# Patient Record
Sex: FEMALE
[De-identification: names, ages, dates, MRNs, and addresses within clinical notes are randomized per-mention and may not be internally consistent; named-entity substitution may affect disease eponyms.]

---

## 2017-02-03 ENCOUNTER — HOSPITAL (OUTPATIENT)
Dept: OTHER | Age: 52
End: 2017-02-03
Attending: INTERNAL MEDICINE

## 2017-02-03 LAB
ALBUMIN SERPL-MCNC: 4.3 GM/DL (ref 3.6–5.1)
ALP SERPL-CCNC: 67 UNIT/L (ref 45–117)
ALT SERPL-CCNC: 22 UNIT/L
AMORPH SED URNS QL MICRO: ABNORMAL
AMPHETAMINES UR QL SCN>500 NG/ML: NEGATIVE
ANALYZER ANC (IANC): ABNORMAL
ANION GAP SERPL CALC-SCNC: 12 MMOL/L (ref 10–20)
ANION GAP SERPL CALC-SCNC: 20 MMOL/L (ref 10–20)
APAP SERPL-MCNC: <2 MCG/ML (ref 10–30)
APPEARANCE UR: ABNORMAL
AST SERPL-CCNC: 25 UNIT/L
BACTERIA #/AREA URNS HPF: ABNORMAL /HPF
BARBITURATES UR QL SCN>200 NG/ML: NEGATIVE
BASOPHILS # BLD: 0 THOUSAND/MCL (ref 0–0.3)
BASOPHILS NFR BLD: 0 %
BENZODIAZ UR QL SCN>200 NG/ML: NEGATIVE
BILIRUB CONJ SERPL-MCNC: 0.1 MG/DL (ref 0–0.2)
BILIRUB SERPL-MCNC: 0.4 MG/DL (ref 0.2–1)
BILIRUB UR QL: NEGATIVE
BUN SERPL-MCNC: 19 MG/DL (ref 10–20)
BUN SERPL-MCNC: 20 MG/DL (ref 10–20)
BUN/CREAT SERPL: 18 (ref 7–25)
BUN/CREAT SERPL: 19 (ref 7–25)
BZE UR QL SCN>150 NG/ML: NEGATIVE
CALCIUM SERPL-MCNC: 7.6 MG/DL (ref 8.4–10.2)
CALCIUM SERPL-MCNC: 8.9 MG/DL (ref 8.4–10.2)
CANNABINOIDS UR QL SCN>50 NG/ML: NEGATIVE
CAOX CRY URNS QL MICRO: ABNORMAL
CHLORIDE: 103 MMOL/L (ref 98–107)
CHLORIDE: 103 MMOL/L (ref 98–107)
CO2 SERPL-SCNC: 21 MMOL/L (ref 21–32)
CO2 SERPL-SCNC: 30 MMOL/L (ref 21–32)
COLOR UR: YELLOW
CREAT SERPL-MCNC: 1 MG/DL (ref 0.51–0.95)
CREAT SERPL-MCNC: 1.14 MG/DL (ref 0.51–0.95)
DIFFERENTIAL METHOD BLD: ABNORMAL
EOSINOPHIL # BLD: 0 THOUSAND/MCL (ref 0.1–0.5)
EOSINOPHIL NFR BLD: 0 %
EPITH CASTS #/AREA URNS LPF: ABNORMAL /[LPF]
ERYTHROCYTE [DISTWIDTH] IN BLOOD: 14 % (ref 11–15)
ETHANOL SERPL-MCNC: NORMAL MG/DL
FATTY CASTS #/AREA URNS LPF: ABNORMAL /[LPF]
GGT SERPL-CCNC: 19 UNIT/L (ref 5–55)
GLUCOSE BLDC GLUCOMTR-MCNC: 134 MG/DL (ref 65–99)
GLUCOSE SERPL-MCNC: 115 MG/DL (ref 65–99)
GLUCOSE SERPL-MCNC: 181 MG/DL (ref 65–99)
GLUCOSE UR-MCNC: NEGATIVE MG/DL
GRAN CASTS #/AREA URNS LPF: ABNORMAL /[LPF]
HEMATOCRIT: 47.1 % (ref 36–46.5)
HGB BLD-MCNC: 16.3 GM/DL (ref 12–15.5)
HGB UR QL: ABNORMAL
HYALINE CASTS #/AREA URNS LPF: ABNORMAL /LPF (ref 0–5)
KETONES UR-MCNC: 20 MG/DL
LEUKOCYTE ESTERASE UR QL STRIP: ABNORMAL
LYMPHOCYTES # BLD: 0.9 THOUSAND/MCL (ref 1–4)
LYMPHOCYTES NFR BLD: 12 %
MAGNESIUM SERPL-MCNC: 2.1 MG/DL (ref 1.7–2.4)
MCH RBC QN AUTO: 31.7 PG (ref 26–34)
MCHC RBC AUTO-ENTMCNC: 34.6 GM/DL (ref 32–36.5)
MCV RBC AUTO: 91.5 FL (ref 78–100)
METHADONE UR QL SCN>300 NG/ML: NEGATIVE NG/ML
MIXED CELL CASTS #/AREA URNS LPF: ABNORMAL /[LPF]
MONOCYTES # BLD: 0.5 THOUSAND/MCL (ref 0.3–0.9)
MONOCYTES NFR BLD: 6 %
MUCOUS THREADS URNS QL MICRO: PRESENT
NEUTROPHILS # BLD: 6.4 THOUSAND/MCL (ref 1.8–7.7)
NEUTROPHILS NFR BLD: 82 %
NEUTS SEG NFR BLD: ABNORMAL %
NITRITE UR QL: NEGATIVE
OPIATES UR QL SCN>300 NG/ML: NEGATIVE
PCP UR QL SCN>25 NG/ML: NEGATIVE
PERCENT NRBC: ABNORMAL
PH UR: 5 UNIT (ref 5–7)
PLATELET # BLD: 304 THOUSAND/MCL (ref 140–450)
POTASSIUM SERPL-SCNC: 4 MMOL/L (ref 3.4–5.1)
POTASSIUM SERPL-SCNC: 4.1 MMOL/L (ref 3.4–5.1)
PROT SERPL-MCNC: 8.2 GM/DL (ref 6.4–8.2)
PROT UR QL: 30 MG/DL
RBC # BLD: 5.15 MILLION/MCL (ref 4–5.2)
RBC #/AREA URNS HPF: ABNORMAL /HPF (ref 0–3)
RBC CASTS #/AREA URNS LPF: ABNORMAL /[LPF]
RENAL EPI CELLS #/AREA URNS HPF: ABNORMAL /[HPF]
SALICYLATES SERPL-MCNC: 32.4 MG/DL
SALICYLATES SERPL-MCNC: 36.9 MG/DL
SALICYLATES SERPL-MCNC: 38.8 MG/DL
SALICYLATES SERPL-MCNC: 50 MG/DL
SODIUM SERPL-SCNC: 140 MMOL/L (ref 135–145)
SODIUM SERPL-SCNC: 141 MMOL/L (ref 135–145)
SP GR UR: 1.02 (ref 1–1.03)
SPECIMEN SOURCE: ABNORMAL
SPERM URNS QL MICRO: ABNORMAL
SQUAMOUS #/AREA URNS HPF: ABNORMAL /HPF (ref 0–5)
T VAGINALIS URNS QL MICRO: ABNORMAL
TRI-PHOS CRY URNS QL MICRO: ABNORMAL
TSH SERPL-ACNC: 0.16 MCUNIT/ML (ref 0.35–5)
URATE CRY URNS QL MICRO: ABNORMAL
URINE REFLEX: ABNORMAL
URNS CMNT MICRO: ABNORMAL
UROBILINOGEN UR QL: 0.2 MG/DL (ref 0–1)
WAXY CASTS #/AREA URNS LPF: ABNORMAL /[LPF]
WBC # BLD: 7.8 THOUSAND/MCL (ref 4.2–11)
WBC #/AREA URNS HPF: ABNORMAL /HPF (ref 0–5)
WBC CASTS #/AREA URNS LPF: ABNORMAL /[LPF]
YEAST HYPHAE URNS QL MICRO: ABNORMAL
YEAST URNS QL MICRO: ABNORMAL

## 2017-02-04 ENCOUNTER — IMAGING SERVICES (OUTPATIENT)
Dept: OTHER | Age: 52
End: 2017-02-04

## 2017-02-04 LAB
ANALYZER ANC (IANC): ABNORMAL
ANION GAP SERPL CALC-SCNC: 10 MMOL/L (ref 10–20)
ANION GAP SERPL CALC-SCNC: 10 MMOL/L (ref 10–20)
BASOPHILS # BLD: 0 THOUSAND/MCL (ref 0–0.3)
BASOPHILS NFR BLD: 0 %
BUN SERPL-MCNC: 12 MG/DL (ref 10–20)
BUN SERPL-MCNC: 16 MG/DL (ref 10–20)
BUN/CREAT SERPL: 15 (ref 7–25)
BUN/CREAT SERPL: 19 (ref 7–25)
CALCIUM SERPL-MCNC: 7.3 MG/DL (ref 8.4–10.2)
CALCIUM SERPL-MCNC: 7.3 MG/DL (ref 8.4–10.2)
CHLORIDE: 101 MMOL/L (ref 98–107)
CHLORIDE: 102 MMOL/L (ref 98–107)
CO2 SERPL-SCNC: 31 MMOL/L (ref 21–32)
CO2 SERPL-SCNC: 32 MMOL/L (ref 21–32)
CREAT SERPL-MCNC: 0.78 MG/DL (ref 0.51–0.95)
CREAT SERPL-MCNC: 0.85 MG/DL (ref 0.51–0.95)
DIFFERENTIAL METHOD BLD: ABNORMAL
EOSINOPHIL # BLD: 0 THOUSAND/MCL (ref 0.1–0.5)
EOSINOPHIL NFR BLD: 0 %
ERYTHROCYTE [DISTWIDTH] IN BLOOD: 14 % (ref 11–15)
GLUCOSE SERPL-MCNC: 161 MG/DL (ref 65–99)
GLUCOSE SERPL-MCNC: 203 MG/DL (ref 65–99)
HEMATOCRIT: 36.4 % (ref 36–46.5)
HGB BLD-MCNC: 13 GM/DL (ref 12–15.5)
LYMPHOCYTES # BLD: 0.9 THOUSAND/MCL (ref 1–4)
LYMPHOCYTES NFR BLD: 18 %
MAGNESIUM SERPL-MCNC: 1.9 MG/DL (ref 1.7–2.4)
MCH RBC QN AUTO: 31.9 PG (ref 26–34)
MCHC RBC AUTO-ENTMCNC: 35.7 GM/DL (ref 32–36.5)
MCV RBC AUTO: 89.2 FL (ref 78–100)
MONOCYTES # BLD: 0.6 THOUSAND/MCL (ref 0.3–0.9)
MONOCYTES NFR BLD: 13 %
NEUTROPHILS # BLD: 3.2 THOUSAND/MCL (ref 1.8–7.7)
NEUTROPHILS NFR BLD: 69 %
NEUTS SEG NFR BLD: ABNORMAL %
PERCENT NRBC: ABNORMAL
PHOSPHATE SERPL-MCNC: 2.8 MG/DL (ref 2.4–4.7)
PLATELET # BLD: 198 THOUSAND/MCL (ref 140–450)
POTASSIUM SERPL-SCNC: 3.2 MMOL/L (ref 3.4–5.1)
POTASSIUM SERPL-SCNC: 4 MMOL/L (ref 3.4–5.1)
RBC # BLD: 4.08 MILLION/MCL (ref 4–5.2)
SALICYLATES SERPL-MCNC: 13.4 MG/DL
SALICYLATES SERPL-MCNC: 26.2 MG/DL
SODIUM SERPL-SCNC: 139 MMOL/L (ref 135–145)
SODIUM SERPL-SCNC: 140 MMOL/L (ref 135–145)
WBC # BLD: 4.7 THOUSAND/MCL (ref 4.2–11)

## 2017-02-05 ENCOUNTER — DIAGNOSTIC TRANS (OUTPATIENT)
Dept: OTHER | Age: 52
End: 2017-02-05

## 2017-02-05 LAB
ANALYZER ANC (IANC): ABNORMAL
ANION GAP SERPL CALC-SCNC: 14 MMOL/L (ref 10–20)
BASOPHILS # BLD: 0 THOUSAND/MCL (ref 0–0.3)
BASOPHILS NFR BLD: 0 %
BUN SERPL-MCNC: 9 MG/DL (ref 10–20)
BUN/CREAT SERPL: 13 (ref 7–25)
CALCIUM SERPL-MCNC: 8.3 MG/DL (ref 8.4–10.2)
CHLORIDE: 104 MMOL/L (ref 98–107)
CO2 SERPL-SCNC: 25 MMOL/L (ref 21–32)
CREAT SERPL-MCNC: 0.67 MG/DL (ref 0.51–0.95)
DIFFERENTIAL METHOD BLD: ABNORMAL
EOSINOPHIL # BLD: 0 THOUSAND/MCL (ref 0.1–0.5)
EOSINOPHIL NFR BLD: 0 %
ERYTHROCYTE [DISTWIDTH] IN BLOOD: 14 % (ref 11–15)
GLUCOSE SERPL-MCNC: 89 MG/DL (ref 65–99)
HEMATOCRIT: 38.2 % (ref 36–46.5)
HGB BLD-MCNC: 13.3 GM/DL (ref 12–15.5)
LYMPHOCYTES # BLD: 2.3 THOUSAND/MCL (ref 1–4)
LYMPHOCYTES NFR BLD: 43 %
MAGNESIUM SERPL-MCNC: 1.9 MG/DL (ref 1.7–2.4)
MCH RBC QN AUTO: 31.7 PG (ref 26–34)
MCHC RBC AUTO-ENTMCNC: 34.8 GM/DL (ref 32–36.5)
MCV RBC AUTO: 91.2 FL (ref 78–100)
MONOCYTES # BLD: 0.5 THOUSAND/MCL (ref 0.3–0.9)
MONOCYTES NFR BLD: 8 %
NEUTROPHILS # BLD: 2.7 THOUSAND/MCL (ref 1.8–7.7)
NEUTROPHILS NFR BLD: 49 %
NEUTS SEG NFR BLD: ABNORMAL %
PERCENT NRBC: ABNORMAL
PHOSPHATE SERPL-MCNC: 3.6 MG/DL (ref 2.4–4.7)
PLATELET # BLD: 200 THOUSAND/MCL (ref 140–450)
POTASSIUM SERPL-SCNC: 4 MMOL/L (ref 3.4–5.1)
RBC # BLD: 4.19 MILLION/MCL (ref 4–5.2)
SODIUM SERPL-SCNC: 139 MMOL/L (ref 135–145)
WBC # BLD: 5.5 THOUSAND/MCL (ref 4.2–11)

## 2017-02-06 ENCOUNTER — HOSPITAL (OUTPATIENT)
Dept: OTHER | Age: 52
End: 2017-02-06
Attending: PSYCHIATRY & NEUROLOGY

## 2017-02-21 ENCOUNTER — HOSPITAL (OUTPATIENT)
Dept: OTHER | Age: 52
End: 2017-02-21
Attending: PSYCHIATRY & NEUROLOGY

## 2017-03-01 ENCOUNTER — HOSPITAL (OUTPATIENT)
Dept: OTHER | Age: 52
End: 2017-03-01
Attending: PSYCHIATRY & NEUROLOGY

## 2021-12-16 ENCOUNTER — HOSPITAL ENCOUNTER (EMERGENCY)
Facility: HOSPITAL | Age: 56
Discharge: HOME | End: 2021-12-16
Attending: EMERGENCY MEDICINE
Payer: COMMERCIAL

## 2021-12-16 VITALS
HEART RATE: 62 BPM | DIASTOLIC BLOOD PRESSURE: 85 MMHG | RESPIRATION RATE: 16 BRPM | WEIGHT: 186 LBS | OXYGEN SATURATION: 97 % | SYSTOLIC BLOOD PRESSURE: 136 MMHG | BODY MASS INDEX: 31.76 KG/M2 | HEIGHT: 64 IN | TEMPERATURE: 99 F

## 2021-12-16 DIAGNOSIS — R11.0 NAUSEA: ICD-10-CM

## 2021-12-16 DIAGNOSIS — U07.1 COVID-19: Primary | ICD-10-CM

## 2021-12-16 PROCEDURE — 96374 THER/PROPH/DIAG INJ IV PUSH: CPT

## 2021-12-16 PROCEDURE — 99284 EMERGENCY DEPT VISIT MOD MDM: CPT

## 2021-12-16 PROCEDURE — 85025 COMPLETE CBC W/AUTO DIFF WBC: CPT | Performed by: EMERGENCY MEDICINE

## 2021-12-16 PROCEDURE — 80048 BASIC METABOLIC PNL TOTAL CA: CPT | Performed by: EMERGENCY MEDICINE

## 2021-12-16 RX ORDER — ONDANSETRON 2 MG/ML
INJECTION INTRAMUSCULAR; INTRAVENOUS
Status: COMPLETED
Start: 2021-12-16 | End: 2021-12-16

## 2021-12-16 RX ORDER — ONDANSETRON 4 MG/1
4 TABLET, ORALLY DISINTEGRATING ORAL EVERY 4 HOURS PRN
Qty: 10 TABLET | Refills: 0 | Status: SHIPPED | OUTPATIENT
Start: 2021-12-16 | End: 2021-12-23

## 2021-12-16 RX ORDER — ALBUTEROL SULFATE 90 UG/1
2 AEROSOL, METERED RESPIRATORY (INHALATION) EVERY 4 HOURS PRN
Qty: 1 EACH | Refills: 0 | Status: SHIPPED | OUTPATIENT
Start: 2021-12-16 | End: 2022-01-15

## 2021-12-16 RX ORDER — ONDANSETRON 2 MG/ML
4 INJECTION INTRAMUSCULAR; INTRAVENOUS ONCE
Status: DISCONTINUED | OUTPATIENT
Start: 2021-12-16 | End: 2021-12-16

## 2021-12-16 RX ORDER — ONDANSETRON 2 MG/ML
4 INJECTION INTRAMUSCULAR; INTRAVENOUS ONCE
Status: COMPLETED | OUTPATIENT
Start: 2021-12-16 | End: 2021-12-16

## 2021-12-16 RX ORDER — HYDROCORTISONE 10 MG/1
15 TABLET ORAL ONCE
Status: COMPLETED | OUTPATIENT
Start: 2021-12-16 | End: 2021-12-16

## 2021-12-16 RX ORDER — HYDROCORTISONE 5 MG/1
40 TABLET ORAL DAILY
Qty: 40 TABLET | Refills: 0 | Status: SHIPPED | OUTPATIENT
Start: 2021-12-16

## 2021-12-16 RX ORDER — HYDROCORTISONE 10 MG/1
5 TABLET ORAL ONCE
Status: COMPLETED | OUTPATIENT
Start: 2021-12-16 | End: 2021-12-16

## 2021-12-16 NOTE — ED PROVIDER NOTES
Patient Seen in: [facility] AND [facility] Emergency Department      History   Patient presents with:  Vomiting  Covid    Stated Complaint: n/v/covid    Subjective:   HPI    40-year-old female with history of [place]'s disease presents for evaluation of nausea. sounds. Abdominal:      General: Bowel sounds are normal. There is no distension. Palpations: Abdomen is soft. Tenderness: There is no abdominal tenderness. There is no guarding or rebound.    Musculoskeletal:         General: Normal range of mo all ED vitals. MDM      Electrolytes within normal limits, normal vital signs, tolerating p.o. including hydrocortisone. Unable to reach patient's previous endocrinologist, Dr. Jewel Boswell.   Discussed with Dr. Donna Boland who recommends doubling hydro

## 2021-12-16 NOTE — ED INITIAL ASSESSMENT (HPI)
[address] [facility] from home--covid + 12/16, nausea since 630 AM . Patient is nervous to start vomiting due to hx of addisons disease.  Oral zofran given by medics

## 2021-12-16 NOTE — ED QUICK NOTES
Pt to ED c/o nausea and fear of vomiting due to her history of intractable vomiting in the past. Pt reports a history of [place]'s disease and did not want to begin vomiting and be unable to take her medications. Pt received +COVID test last night.  Denies

## 2021-12-22 ENCOUNTER — HOSPITAL ENCOUNTER (OUTPATIENT)
Age: 56
Discharge: HOME | End: 2021-12-22
Payer: COMMERCIAL

## 2021-12-22 VITALS
OXYGEN SATURATION: 97 % | RESPIRATION RATE: 16 BRPM | TEMPERATURE: 98 F | DIASTOLIC BLOOD PRESSURE: 84 MMHG | SYSTOLIC BLOOD PRESSURE: 141 MMHG | HEART RATE: 65 BPM

## 2021-12-22 DIAGNOSIS — U07.1 COVID-19 VIRUS INFECTION: Primary | ICD-10-CM

## 2021-12-22 PROCEDURE — 99203 OFFICE O/P NEW LOW 30 MIN: CPT | Performed by: NURSE PRACTITIONER

## 2021-12-22 NOTE — ED PROVIDER NOTES
Patient Seen in: [facility]      History   Patient presents with:  Blood Pressure: High    Stated Complaint: BP check    Subjective:   Patient presents to the immediate care accompanied by self.  Patient reports she contracted Covid and tested SpO2 97 %   O2 Device None (Room air)       Current:/84   Pulse 65   Temp 98 °F (36.7 °C) (Temporal)   Resp 16   SpO2 97%         Physical Exam  Vitals and nursing note reviewed. Constitutional:       General: She is not in acute distress.      Ap well-hydrated, nontoxic appearing. Patient has history of asthma and [place]'s disease. Patient presents to the immediate care accompanied by self.  Patient reports she contracted Covid and tested positive on December 13, she was seen in the emergency r

## 2021-12-22 NOTE — ED INITIAL ASSESSMENT (HPI)
Pt has 5 year hx of [place]'s disease. Pt tested COVID positive Monday, December 13th. The following Thursday Pt went to the ER to be treated for nausea and dizziness, she received the COVID antibody infusion while there.   Pt was advised to quarantine and

## 2022-01-27 ENCOUNTER — HOSPITAL ENCOUNTER (OUTPATIENT)
Age: 57
Discharge: HOME | End: 2022-01-27
Payer: COMMERCIAL

## 2022-01-27 VITALS
HEIGHT: 64 IN | DIASTOLIC BLOOD PRESSURE: 78 MMHG | WEIGHT: 190 LBS | TEMPERATURE: 98 F | OXYGEN SATURATION: 99 % | SYSTOLIC BLOOD PRESSURE: 128 MMHG | HEART RATE: 64 BPM | BODY MASS INDEX: 32.44 KG/M2 | RESPIRATION RATE: 16 BRPM

## 2022-01-27 DIAGNOSIS — H60.501 ACUTE OTITIS EXTERNA OF RIGHT EAR, UNSPECIFIED TYPE: Primary | ICD-10-CM

## 2022-01-27 PROCEDURE — 99213 OFFICE O/P EST LOW 20 MIN: CPT | Performed by: NURSE PRACTITIONER

## 2022-01-27 RX ORDER — OFLOXACIN 3 MG/ML
10 SOLUTION AURICULAR (OTIC) DAILY
Qty: 1 EACH | Refills: 0 | Status: SHIPPED | OUTPATIENT
Start: 2022-01-27 | End: 2022-02-03

## 2022-01-27 NOTE — ED PROVIDER NOTES
Patient presents with:  Ear Problem Pain      HPI:     Dane Loaiza is a 60year old female who presents with a chief complaint of pain to the right ear canal that started a few days ago. She states she swims daily, so believes she has swimmer's ear.   Nikki Doyle 32.61 kg/m²   GENERAL: well developed, well nourished, well hydrated, no distress  SKIN: good skin turgor, no obvious rashes  NECK: supple, no adenopathy. No neck stiffness. CARDIO: RRR without murmur  EXTREMITIES: no cyanosis or edema.  EID without diffic

## 2022-04-06 ENCOUNTER — HOSPITAL ENCOUNTER (OUTPATIENT)
Age: 57
Discharge: HOME | End: 2022-04-06
Attending: EMERGENCY MEDICINE
Payer: COMMERCIAL

## 2022-04-06 ENCOUNTER — APPOINTMENT (OUTPATIENT)
Dept: GENERAL RADIOLOGY | Age: 57
End: 2022-04-06
Attending: EMERGENCY MEDICINE
Payer: COMMERCIAL

## 2022-04-06 VITALS
DIASTOLIC BLOOD PRESSURE: 93 MMHG | HEART RATE: 63 BPM | RESPIRATION RATE: 14 BRPM | TEMPERATURE: 97 F | SYSTOLIC BLOOD PRESSURE: 141 MMHG | OXYGEN SATURATION: 98 %

## 2022-04-06 DIAGNOSIS — S63.641A SPRAIN OF METACARPOPHALANGEAL JOINT OF RIGHT THUMB, INITIAL ENCOUNTER: Primary | ICD-10-CM

## 2022-04-06 PROCEDURE — 99213 OFFICE O/P EST LOW 20 MIN: CPT

## 2022-04-06 PROCEDURE — 73140 X-RAY EXAM OF FINGER(S): CPT | Performed by: EMERGENCY MEDICINE

## 2022-04-06 NOTE — ED INITIAL ASSESSMENT (HPI)
Injured right thumb 1 week ago. Taped and today re-injured. Redness and throbbing pain in right hand. Numbness and tingling to elbow.  Limited movement to thumb

## 2022-08-12 ENCOUNTER — TELEPHONE (OUTPATIENT)
Dept: ENDOCRINOLOGY CLINIC | Facility: CLINIC | Age: 57
End: 2022-08-12

## 2022-08-12 NOTE — TELEPHONE ENCOUNTER
Pt called in stating she is a little upset that her appt had to be canceled due to the provider having to cancel for that day. Pt is requesting only to see Dr. Favio Park and has scheduled for the soonest date but would like to see if she can get in sooner if possible. Pt is very persistent.  Please call

## 2022-08-12 NOTE — TELEPHONE ENCOUNTER
Dr. Bandar Mccartney    Please advise. Patient was scheduled for 10/7/22, rescheduled for 2/17/23 ok to offer sooner appointment if available?

## 2022-08-18 ENCOUNTER — OFFICE VISIT (OUTPATIENT)
Dept: ENDOCRINOLOGY CLINIC | Facility: CLINIC | Age: 57
End: 2022-08-18
Payer: COMMERCIAL

## 2022-08-18 ENCOUNTER — LAB ENCOUNTER (OUTPATIENT)
Dept: LAB | Facility: HOSPITAL | Age: 57
End: 2022-08-18
Attending: INTERNAL MEDICINE
Payer: COMMERCIAL

## 2022-08-18 VITALS
HEART RATE: 66 BPM | SYSTOLIC BLOOD PRESSURE: 119 MMHG | BODY MASS INDEX: 36 KG/M2 | DIASTOLIC BLOOD PRESSURE: 82 MMHG | WEIGHT: 212 LBS

## 2022-08-18 DIAGNOSIS — E03.8 HYPOTHYROIDISM DUE TO HASHIMOTO'S THYROIDITIS: ICD-10-CM

## 2022-08-18 DIAGNOSIS — E27.1 ADDISON'S DISEASE (HCC): Primary | ICD-10-CM

## 2022-08-18 DIAGNOSIS — E55.9 VITAMIN D DEFICIENCY: ICD-10-CM

## 2022-08-18 DIAGNOSIS — E06.3 HYPOTHYROIDISM DUE TO HASHIMOTO'S THYROIDITIS: ICD-10-CM

## 2022-08-18 DIAGNOSIS — E27.1 ADDISON'S DISEASE (HCC): ICD-10-CM

## 2022-08-18 PROBLEM — R73.03 PREDIABETES: Status: ACTIVE | Noted: 2022-02-15

## 2022-08-18 PROBLEM — F33.9 RECURRENT MAJOR DEPRESSIVE DISORDER: Status: ACTIVE | Noted: 2018-10-31

## 2022-08-18 PROBLEM — F33.9 RECURRENT MAJOR DEPRESSIVE DISORDER (HCC): Status: ACTIVE | Noted: 2018-10-31

## 2022-08-18 PROBLEM — E74.39 GLUCOSE INTOLERANCE: Status: ACTIVE | Noted: 2020-02-28

## 2022-08-18 LAB
ALBUMIN SERPL-MCNC: 3.7 G/DL (ref 3.4–5)
ALBUMIN/GLOB SERPL: 1.1 {RATIO} (ref 1–2)
ALP LIVER SERPL-CCNC: 63 U/L
ALT SERPL-CCNC: 73 U/L
ANION GAP SERPL CALC-SCNC: 6 MMOL/L (ref 0–18)
AST SERPL-CCNC: 49 U/L (ref 15–37)
BILIRUB SERPL-MCNC: 0.4 MG/DL (ref 0.1–2)
BUN BLD-MCNC: 16 MG/DL (ref 7–18)
BUN/CREAT SERPL: 17.8 (ref 10–20)
CALCIUM BLD-MCNC: 8.8 MG/DL (ref 8.5–10.1)
CHLORIDE SERPL-SCNC: 107 MMOL/L (ref 98–112)
CO2 SERPL-SCNC: 28 MMOL/L (ref 21–32)
CREAT BLD-MCNC: 0.9 MG/DL
FASTING STATUS PATIENT QL REPORTED: NO
GFR SERPLBLD BASED ON 1.73 SQ M-ARVRAT: 74 ML/MIN/1.73M2 (ref 60–?)
GLOBULIN PLAS-MCNC: 3.5 G/DL (ref 2.8–4.4)
GLUCOSE BLD-MCNC: 79 MG/DL (ref 70–99)
OSMOLALITY SERPL CALC.SUM OF ELEC: 292 MOSM/KG (ref 275–295)
POTASSIUM SERPL-SCNC: 5 MMOL/L (ref 3.5–5.1)
PROT SERPL-MCNC: 7.2 G/DL (ref 6.4–8.2)
SODIUM SERPL-SCNC: 141 MMOL/L (ref 136–145)
T4 FREE SERPL-MCNC: 1.3 NG/DL (ref 0.8–1.7)
TSI SER-ACNC: 0.14 MIU/ML (ref 0.36–3.74)
VIT D+METAB SERPL-MCNC: 35.4 NG/ML (ref 30–100)

## 2022-08-18 PROCEDURE — 99244 OFF/OP CNSLTJ NEW/EST MOD 40: CPT | Performed by: INTERNAL MEDICINE

## 2022-08-18 PROCEDURE — 36415 COLL VENOUS BLD VENIPUNCTURE: CPT

## 2022-08-18 PROCEDURE — 82306 VITAMIN D 25 HYDROXY: CPT

## 2022-08-18 PROCEDURE — 80053 COMPREHEN METABOLIC PANEL: CPT

## 2022-08-18 PROCEDURE — 84439 ASSAY OF FREE THYROXINE: CPT

## 2022-08-18 PROCEDURE — 3079F DIAST BP 80-89 MM HG: CPT | Performed by: INTERNAL MEDICINE

## 2022-08-18 PROCEDURE — 3074F SYST BP LT 130 MM HG: CPT | Performed by: INTERNAL MEDICINE

## 2022-08-18 PROCEDURE — 84443 ASSAY THYROID STIM HORMONE: CPT

## 2022-08-18 RX ORDER — HYDROCORTISONE 20 MG/1
TABLET ORAL
COMMUNITY
Start: 2022-04-14 | End: 2022-08-18

## 2022-08-18 RX ORDER — ONDANSETRON 4 MG/1
4 TABLET, FILM COATED ORAL EVERY 8 HOURS PRN
Qty: 20 TABLET | Refills: 0 | Status: SHIPPED | OUTPATIENT
Start: 2022-08-18

## 2022-08-18 RX ORDER — LEVOTHYROXINE SODIUM 112 UG/1
112 TABLET ORAL
Qty: 90 TABLET | Refills: 3 | Status: SHIPPED | OUTPATIENT
Start: 2022-08-18

## 2022-08-18 RX ORDER — FLUDROCORTISONE ACETATE 0.1 MG/1
TABLET ORAL
Qty: 45 TABLET | Refills: 2 | Status: SHIPPED | OUTPATIENT
Start: 2022-08-18

## 2022-08-18 RX ORDER — LEVOTHYROXINE SODIUM 112 UG/1
112 TABLET ORAL DAILY
COMMUNITY
Start: 2021-11-05 | End: 2023-02-15

## 2022-08-18 RX ORDER — HYDROCORTISONE 5 MG/1
TABLET ORAL
Qty: 360 TABLET | Refills: 2 | Status: SHIPPED | OUTPATIENT
Start: 2022-08-18

## 2022-08-18 RX ORDER — HYDROCORTISONE 20 MG/1
20 TABLET ORAL DAILY
Qty: 30 TABLET | Refills: 1 | Status: SHIPPED | OUTPATIENT
Start: 2022-08-18

## 2022-11-03 ENCOUNTER — OFFICE VISIT (OUTPATIENT)
Dept: ENDOCRINOLOGY CLINIC | Facility: CLINIC | Age: 57
End: 2022-11-03
Payer: COMMERCIAL

## 2022-11-03 VITALS
SYSTOLIC BLOOD PRESSURE: 130 MMHG | WEIGHT: 213 LBS | BODY MASS INDEX: 37 KG/M2 | HEART RATE: 69 BPM | DIASTOLIC BLOOD PRESSURE: 82 MMHG

## 2022-11-03 DIAGNOSIS — E03.8 HYPOTHYROIDISM DUE TO HASHIMOTO'S THYROIDITIS: ICD-10-CM

## 2022-11-03 DIAGNOSIS — E27.1 ADRENAL INSUFFICIENCY (ADDISON'S DISEASE) (HCC): Primary | ICD-10-CM

## 2022-11-03 DIAGNOSIS — E06.3 HYPOTHYROIDISM DUE TO HASHIMOTO'S THYROIDITIS: ICD-10-CM

## 2022-11-03 DIAGNOSIS — E55.9 VITAMIN D DEFICIENCY: ICD-10-CM

## 2022-11-03 PROCEDURE — 3079F DIAST BP 80-89 MM HG: CPT | Performed by: INTERNAL MEDICINE

## 2022-11-03 PROCEDURE — 99214 OFFICE O/P EST MOD 30 MIN: CPT | Performed by: INTERNAL MEDICINE

## 2022-11-03 PROCEDURE — 3075F SYST BP GE 130 - 139MM HG: CPT | Performed by: INTERNAL MEDICINE

## 2022-11-03 RX ORDER — HYDROCORTISONE SOD SUCCINATE 100 MG
100 VIAL (EA) INJECTION EVERY 8 HOURS SCHEDULED
COMMUNITY
End: 2022-11-03

## 2022-11-03 RX ORDER — SEMAGLUTIDE 1.34 MG/ML
0.5 INJECTION, SOLUTION SUBCUTANEOUS WEEKLY
Qty: 1.5 ML | Refills: 0 | Status: SHIPPED | COMMUNITY
Start: 2022-11-03

## 2022-11-03 RX ORDER — ONDANSETRON 4 MG/1
4 TABLET, FILM COATED ORAL EVERY 8 HOURS PRN
Qty: 20 TABLET | Refills: 0 | Status: SHIPPED | OUTPATIENT
Start: 2022-11-03

## 2022-11-03 RX ORDER — HYDROCORTISONE 10 MG/1
TABLET ORAL
COMMUNITY
Start: 2021-11-05

## 2022-11-03 RX ORDER — DEXAMETHASONE SODIUM PHOSPHATE 4 MG/ML
12 VIAL (ML) INJECTION ONCE
Status: SHIPPED | OUTPATIENT
Start: 2022-11-03

## 2022-11-03 NOTE — PATIENT INSTRUCTIONS
My Fitness Pal    Calorie Sidney Demetrio     START Ozempic 0.25mg subcutaneous weekly for one month then increase to 0.5mg subcutaneous weekly

## 2022-11-03 NOTE — PROGRESS NOTES
Sample of Ozempic starter dose provided to patient in clinic today. Reviewed administration, storage, potential medication interactions, allergies, and side effects of the medication with patient. Reviewed dosage and subcutaneous injection technique. Verbal instructions of the above including medication dose, route, frequency, storage, administration, and potential side effects provided to patient.

## 2022-12-01 ENCOUNTER — PATIENT MESSAGE (OUTPATIENT)
Dept: ENDOCRINOLOGY CLINIC | Facility: CLINIC | Age: 57
End: 2022-12-01

## 2022-12-01 NOTE — TELEPHONE ENCOUNTER
Per chart review, patient is on ozempic 0.5 mg weekly. Plan was to transition to wegovy once available, which I believe would be next year Jan 2023. Will route to provider to advise on options. Sample or different GLP.

## 2022-12-01 NOTE — TELEPHONE ENCOUNTER
[id number] Angeles Aguilar lets give her one more sample box to use until [place] is available in January. Thanks.

## 2022-12-02 ENCOUNTER — HOSPITAL ENCOUNTER (OUTPATIENT)
Age: 57
Discharge: HOME | End: 2022-12-02
Payer: COMMERCIAL

## 2022-12-02 VITALS
OXYGEN SATURATION: 97 % | DIASTOLIC BLOOD PRESSURE: 77 MMHG | SYSTOLIC BLOOD PRESSURE: 125 MMHG | HEART RATE: 78 BPM | RESPIRATION RATE: 18 BRPM | TEMPERATURE: 98 F

## 2022-12-02 DIAGNOSIS — J03.90 ACUTE TONSILLITIS, UNSPECIFIED ETIOLOGY: Primary | ICD-10-CM

## 2022-12-02 DIAGNOSIS — R50.9 FEVER: ICD-10-CM

## 2022-12-02 LAB
POCT INFLUENZA A: NEGATIVE
POCT INFLUENZA B: NEGATIVE
S PYO AG THROAT QL: NEGATIVE

## 2022-12-02 PROCEDURE — 87880 STREP A ASSAY W/OPTIC: CPT | Performed by: EMERGENCY MEDICINE

## 2022-12-02 PROCEDURE — 87502 INFLUENZA DNA AMP PROBE: CPT | Performed by: EMERGENCY MEDICINE

## 2022-12-02 PROCEDURE — 99213 OFFICE O/P EST LOW 20 MIN: CPT | Performed by: EMERGENCY MEDICINE

## 2022-12-02 RX ORDER — CEFDINIR 300 MG/1
300 CAPSULE ORAL 2 TIMES DAILY
Qty: 20 CAPSULE | Refills: 0 | Status: SHIPPED | OUTPATIENT
Start: 2022-12-02 | End: 2022-12-12

## 2022-12-02 NOTE — DISCHARGE INSTRUCTIONS
Your strep test was negative, but I am treating you for bacterial tonsillitis based on exposure, symptoms, no upper respiratory cough symptoms, and physical exam.  If the antibiotics are not helping you your symptoms are viral and another antibiotic will not work. Supportive care for your symptoms such as Tylenol, ibuprofen, or cold medications. Antibiotics will not treat your symptoms it will treat a bacterial infection.    Salt water gargles every few hours  Call your primary care for follow-up as needed

## 2022-12-07 ENCOUNTER — NURSE ONLY (OUTPATIENT)
Dept: ENDOCRINOLOGY CLINIC | Facility: CLINIC | Age: 57
End: 2022-12-07
Payer: COMMERCIAL

## 2022-12-07 DIAGNOSIS — R73.03 PREDIABETES: Primary | ICD-10-CM

## 2022-12-08 RX ORDER — SEMAGLUTIDE 1.34 MG/ML
0.5 INJECTION, SOLUTION SUBCUTANEOUS WEEKLY
Qty: 1.5 ML | Refills: 0 | Status: SHIPPED | COMMUNITY
Start: 2022-12-08

## 2022-12-08 RX ORDER — SEMAGLUTIDE 1.34 MG/ML
0.5 INJECTION, SOLUTION SUBCUTANEOUS WEEKLY
Qty: 1.5 ML | Refills: 0 | COMMUNITY
Start: 2022-12-08

## 2022-12-08 NOTE — PROGRESS NOTES
Sample of 8 Alexandrea Gamez 2 mg/1.5mL provided to patient in clinic today. Reviewed administration, storage, potential medication interactions, allergies, and side effects of the medication with patient. Reviewed dosage and subcutaneous injection technique. Written instructions of the above including medication dose, route, frequency, storage, administration, and potential side effects provided to patient.

## 2022-12-26 ENCOUNTER — PATIENT MESSAGE (OUTPATIENT)
Dept: ENDOCRINOLOGY CLINIC | Facility: CLINIC | Age: 57
End: 2022-12-26

## 2022-12-27 ENCOUNTER — PATIENT MESSAGE (OUTPATIENT)
Dept: ENDOCRINOLOGY CLINIC | Facility: CLINIC | Age: 57
End: 2022-12-27

## 2022-12-27 RX ORDER — LEVOTHYROXINE SODIUM 112 UG/1
112 TABLET ORAL DAILY
Qty: 30 TABLET | Refills: 15 | Status: SHIPPED | OUTPATIENT
Start: 2022-12-27 | End: 2024-04-07

## 2022-12-27 NOTE — TELEPHONE ENCOUNTER
From: Juan C Kang  To: Marcie Galarza MD  Sent: 12/26/2022 4:39 PM CST  Subject: Levoxil. 112 (thyroid medication)    Hello and [employer] Pro encountered a problem with my levoxil prescription. [facility] is having difficulty getting it in stock so I am unable to refill this month. Currently I take name brand but am open to trying another medication until levoxil is available again. Please advise.

## 2023-01-20 ENCOUNTER — PATIENT MESSAGE (OUTPATIENT)
Dept: ENDOCRINOLOGY CLINIC | Facility: CLINIC | Age: 58
End: 2023-01-20

## 2023-01-20 RX ORDER — SEMAGLUTIDE 1 MG/.5ML
1 INJECTION, SOLUTION SUBCUTANEOUS WEEKLY
Qty: 4 EACH | Refills: 0 | Status: SHIPPED | OUTPATIENT
Start: 2023-01-20

## 2023-01-31 RX ORDER — LEVOTHYROXINE SODIUM 112 UG/1
112 TABLET ORAL
Qty: 90 TABLET | Refills: 0 | Status: SHIPPED | OUTPATIENT
Start: 2023-01-31

## 2023-01-31 RX ORDER — HYDROCORTISONE 10 MG/1
TABLET ORAL
Qty: 90 TABLET | Refills: 0 | Status: SHIPPED | OUTPATIENT
Start: 2023-01-31 | End: 2023-01-31

## 2023-01-31 RX ORDER — HYDROCORTISONE 5 MG/1
TABLET ORAL
Qty: 180 TABLET | Refills: 0 | Status: SHIPPED | OUTPATIENT
Start: 2023-01-31 | End: 2023-01-31

## 2023-01-31 RX ORDER — SEMAGLUTIDE 1 MG/.5ML
1 INJECTION, SOLUTION SUBCUTANEOUS WEEKLY
Qty: 6 ML | Refills: 0 | Status: SHIPPED | OUTPATIENT
Start: 2023-01-31 | End: 2023-01-31

## 2023-01-31 RX ORDER — FLUDROCORTISONE ACETATE 0.1 MG/1
TABLET ORAL
Qty: 45 TABLET | Refills: 0 | Status: SHIPPED | OUTPATIENT
Start: 2023-01-31

## 2023-02-01 RX ORDER — HYDROCORTISONE 5 MG/1
TABLET ORAL
Qty: 180 TABLET | Refills: 0 | Status: SHIPPED | OUTPATIENT
Start: 2023-02-01

## 2023-02-01 RX ORDER — HYDROCORTISONE 10 MG/1
TABLET ORAL
Qty: 90 TABLET | Refills: 0 | Status: SHIPPED | OUTPATIENT
Start: 2023-02-01

## 2023-02-01 RX ORDER — HYDROCORTISONE 20 MG/1
20 TABLET ORAL DAILY
Qty: 30 TABLET | Refills: 1 | Status: SHIPPED | OUTPATIENT
Start: 2023-02-01

## 2023-02-01 RX ORDER — SEMAGLUTIDE 1 MG/.5ML
1 INJECTION, SOLUTION SUBCUTANEOUS WEEKLY
Qty: 6 ML | Refills: 0 | Status: SHIPPED | OUTPATIENT
Start: 2023-02-01

## 2023-02-01 RX ORDER — LEVOTHYROXINE SODIUM 112 UG/1
112 TABLET ORAL DAILY
Qty: 90 TABLET | Refills: 1 | Status: SHIPPED | OUTPATIENT
Start: 2023-02-01

## 2023-02-01 NOTE — TELEPHONE ENCOUNTER
LOV: 11/03/22    Future Appointments   Date Time Provider Long [place]   3/31/2023  7:30 AM Charolett Simmonds, MD [facility]

## 2023-02-08 RX ORDER — LEVOTHYROXINE SODIUM 112 UG/1
112 TABLET ORAL DAILY
Qty: 90 TABLET | Refills: 1 | Status: CANCELLED | OUTPATIENT
Start: 2023-02-08

## 2023-02-08 RX ORDER — LEVOTHYROXINE SODIUM 112 UG/1
112 TABLET ORAL
Qty: 90 TABLET | Refills: 0 | Status: SHIPPED | OUTPATIENT
Start: 2023-02-08

## 2023-03-11 ENCOUNTER — PATIENT MESSAGE (OUTPATIENT)
Dept: ENDOCRINOLOGY CLINIC | Facility: CLINIC | Age: 58
End: 2023-03-11

## 2023-03-12 NOTE — TELEPHONE ENCOUNTER
Dr. Israel See,     I believe the the medication is getting rejected because the 1 mg dose needs to be titrated. I have seen this kind of denial with one of Dr. Denise Weaver patient. PA would be needed to explain why patient would need to stay on the lower dose. RN sent [employer] message explaining this and asking how she tolerated 1mg dose. There is already an approval for this medication through Aug 2023.

## 2023-03-13 RX ORDER — SEMAGLUTIDE 2.4 MG/.75ML
2.4 INJECTION, SOLUTION SUBCUTANEOUS WEEKLY
Qty: 4 EACH | Refills: 0 | Status: SHIPPED | OUTPATIENT
Start: 2023-04-13

## 2023-03-13 RX ORDER — SEMAGLUTIDE 1.7 MG/.75ML
1.7 INJECTION, SOLUTION SUBCUTANEOUS WEEKLY
Qty: 4 EACH | Refills: 0 | Status: SHIPPED | OUTPATIENT
Start: 2023-03-13

## 2023-03-22 ENCOUNTER — HOSPITAL ENCOUNTER (OUTPATIENT)
Age: 58
Discharge: HOME | End: 2023-03-22
Payer: COMMERCIAL

## 2023-03-22 VITALS
OXYGEN SATURATION: 98 % | WEIGHT: 183 LBS | HEART RATE: 68 BPM | TEMPERATURE: 97 F | SYSTOLIC BLOOD PRESSURE: 126 MMHG | BODY MASS INDEX: 31.24 KG/M2 | DIASTOLIC BLOOD PRESSURE: 71 MMHG | HEIGHT: 64 IN | RESPIRATION RATE: 18 BRPM

## 2023-03-22 DIAGNOSIS — J01.90 ACUTE SINUSITIS, RECURRENCE NOT SPECIFIED, UNSPECIFIED LOCATION: Primary | ICD-10-CM

## 2023-03-22 LAB — SARS-COV-2 RNA RESP QL NAA+PROBE: NOT DETECTED

## 2023-03-22 PROCEDURE — U0002 COVID-19 LAB TEST NON-CDC: HCPCS | Performed by: NURSE PRACTITIONER

## 2023-03-22 PROCEDURE — 99213 OFFICE O/P EST LOW 20 MIN: CPT | Performed by: NURSE PRACTITIONER

## 2023-03-22 RX ORDER — DOXYCYCLINE HYCLATE 100 MG/1
100 CAPSULE ORAL 2 TIMES DAILY
Qty: 20 CAPSULE | Refills: 0 | Status: SHIPPED | OUTPATIENT
Start: 2023-03-22 | End: 2023-04-01

## 2023-03-22 NOTE — ED INITIAL ASSESSMENT (HPI)
Pt c/o nasal congestion, chest congestion, HA, sinus pain/pressure for almost 3 weeks, fever on and off states hx asthma, using inhaler

## 2023-03-22 NOTE — DISCHARGE INSTRUCTIONS
Tylenol as needed for pain or fever. Take the antibiotics as prescribed. Follow-up as needed with your doctor. Return for any concerns.

## 2023-03-31 ENCOUNTER — OFFICE VISIT (OUTPATIENT)
Dept: ENDOCRINOLOGY CLINIC | Facility: CLINIC | Age: 58
End: 2023-03-31

## 2023-03-31 VITALS — BODY MASS INDEX: 33 KG/M2 | WEIGHT: 191 LBS

## 2023-03-31 DIAGNOSIS — E66.9 OBESITY (BMI 30.0-34.9): ICD-10-CM

## 2023-03-31 DIAGNOSIS — E06.3 HYPOTHYROIDISM DUE TO HASHIMOTO'S THYROIDITIS: ICD-10-CM

## 2023-03-31 DIAGNOSIS — E03.8 HYPOTHYROIDISM DUE TO HASHIMOTO'S THYROIDITIS: ICD-10-CM

## 2023-03-31 DIAGNOSIS — E27.1 ADDISON'S DISEASE (HCC): Primary | ICD-10-CM

## 2023-03-31 PROCEDURE — 99214 OFFICE O/P EST MOD 30 MIN: CPT | Performed by: INTERNAL MEDICINE

## 2023-03-31 RX ORDER — HYDROCORTISONE 10 MG/1
TABLET ORAL
Qty: 90 TABLET | Refills: 0 | Status: CANCELLED | OUTPATIENT
Start: 2023-03-31

## 2023-03-31 RX ORDER — HYDROCORTISONE 5 MG/1
TABLET ORAL
Qty: 180 TABLET | Refills: 0 | Status: CANCELLED | OUTPATIENT
Start: 2023-03-31

## 2023-03-31 NOTE — TELEPHONE ENCOUNTER
LOV: today  RTC: 4M  Last refill: 2/1/2023 and 2/8/2023    Dr. Gabriella Tinsley review and sign pended prescription if agreeable.

## 2023-04-01 ENCOUNTER — PATIENT MESSAGE (OUTPATIENT)
Dept: ENDOCRINOLOGY CLINIC | Facility: CLINIC | Age: 58
End: 2023-04-01

## 2023-04-01 RX ORDER — HYDROCORTISONE 20 MG/1
20 TABLET ORAL DAILY
Qty: 30 TABLET | Refills: 1 | Status: SHIPPED | OUTPATIENT
Start: 2023-04-01

## 2023-04-01 RX ORDER — HYDROCORTISONE 10 MG/1
TABLET ORAL
Qty: 90 TABLET | Refills: 0 | Status: SHIPPED | OUTPATIENT
Start: 2023-04-01

## 2023-04-01 RX ORDER — HYDROCORTISONE 5 MG/1
TABLET ORAL
Qty: 180 TABLET | Refills: 0 | Status: SHIPPED | OUTPATIENT
Start: 2023-04-01

## 2023-04-01 RX ORDER — LEVOTHYROXINE SODIUM 112 UG/1
112 TABLET ORAL
Qty: 90 TABLET | Refills: 0 | Status: SHIPPED | OUTPATIENT
Start: 2023-04-01

## 2023-05-11 ENCOUNTER — PATIENT MESSAGE (OUTPATIENT)
Dept: ENDOCRINOLOGY CLINIC | Facility: CLINIC | Age: 58
End: 2023-05-11

## 2023-05-11 RX ORDER — SEMAGLUTIDE 2.4 MG/.75ML
INJECTION, SOLUTION SUBCUTANEOUS
Qty: 3 ML | Refills: 0 | Status: SHIPPED | OUTPATIENT
Start: 2023-05-11

## 2023-05-12 NOTE — TELEPHONE ENCOUNTER
[facility] sent to pt stating Dr Lieutenant Petersen refilled the [facility] STEFFANY 2.4 mg dose on 5/11/2023 to the [facility] in [facility] on [address]. Awaiting possible pt response.

## 2023-05-12 NOTE — TELEPHONE ENCOUNTER
From: Malini Hatch  To: Joe Gabriel MD  Sent: 5/11/2023 10:19 AM CDT  Subject: Ozempic     Good morning!! Could you please approve a refill for Ozempic 2.4. It is working great. I am walking 3 miles a day and have incorporated a weight resistance program as well.   Thank you!!

## 2023-06-12 RX ORDER — SEMAGLUTIDE 2.4 MG/.75ML
INJECTION, SOLUTION SUBCUTANEOUS
Qty: 3 ML | Refills: 0 | Status: SHIPPED | OUTPATIENT
Start: 2023-06-12

## 2023-06-14 RX ORDER — SEMAGLUTIDE 2.4 MG/.75ML
INJECTION, SOLUTION SUBCUTANEOUS
Qty: 3 ML | Refills: 2 | Status: SHIPPED | OUTPATIENT
Start: 2023-06-14

## 2023-06-14 NOTE — TELEPHONE ENCOUNTER
LOV: 3/31/23    Future Appointments   Date Time Provider Long [place]   8/4/2023  8:00 AM Robbi Ta MD [facility]     Refilled per protocol.

## 2023-07-08 ENCOUNTER — HOSPITAL ENCOUNTER (OUTPATIENT)
Age: 58
Discharge: HOME | End: 2023-07-08
Payer: COMMERCIAL

## 2023-07-08 VITALS
WEIGHT: 183 LBS | TEMPERATURE: 98 F | DIASTOLIC BLOOD PRESSURE: 56 MMHG | RESPIRATION RATE: 16 BRPM | HEART RATE: 89 BPM | OXYGEN SATURATION: 99 % | HEIGHT: 64 IN | SYSTOLIC BLOOD PRESSURE: 106 MMHG | BODY MASS INDEX: 31.24 KG/M2

## 2023-07-08 DIAGNOSIS — J01.40 ACUTE NON-RECURRENT PANSINUSITIS: Primary | ICD-10-CM

## 2023-07-08 LAB — SARS-COV-2 RNA RESP QL NAA+PROBE: NOT DETECTED

## 2023-07-08 RX ORDER — DOXYCYCLINE HYCLATE 100 MG/1
100 CAPSULE ORAL 2 TIMES DAILY
Qty: 20 CAPSULE | Refills: 0 | Status: SHIPPED | OUTPATIENT
Start: 2023-07-08 | End: 2023-07-18

## 2023-07-08 NOTE — ED INITIAL ASSESSMENT (HPI)
Pt here w c/o sinus pressure to brow and cheek area w post nasal drip that is triggering her asthma, +cough. X Monday.

## 2023-08-01 ENCOUNTER — LAB ENCOUNTER (OUTPATIENT)
Dept: LAB | Facility: HOSPITAL | Age: 58
End: 2023-08-01
Attending: INTERNAL MEDICINE
Payer: COMMERCIAL

## 2023-08-01 DIAGNOSIS — E27.1 ADDISON'S DISEASE (HCC): ICD-10-CM

## 2023-08-01 DIAGNOSIS — E03.8 HYPOTHYROIDISM DUE TO HASHIMOTO'S THYROIDITIS: ICD-10-CM

## 2023-08-01 DIAGNOSIS — E06.3 HYPOTHYROIDISM DUE TO HASHIMOTO'S THYROIDITIS: ICD-10-CM

## 2023-08-01 LAB
ALBUMIN SERPL-MCNC: 3.8 G/DL (ref 3.4–5)
ALBUMIN/GLOB SERPL: 1.1 {RATIO} (ref 1–2)
ALP LIVER SERPL-CCNC: 50 U/L
ALT SERPL-CCNC: 42 U/L
ANION GAP SERPL CALC-SCNC: 8 MMOL/L (ref 0–18)
AST SERPL-CCNC: 36 U/L (ref 15–37)
BILIRUB SERPL-MCNC: 0.8 MG/DL (ref 0.1–2)
BUN BLD-MCNC: 14 MG/DL (ref 7–18)
BUN/CREAT SERPL: 15.1 (ref 10–20)
CALCIUM BLD-MCNC: 9.4 MG/DL (ref 8.5–10.1)
CHLORIDE SERPL-SCNC: 106 MMOL/L (ref 98–112)
CHOLEST SERPL-MCNC: 175 MG/DL (ref ?–200)
CO2 SERPL-SCNC: 26 MMOL/L (ref 21–32)
CREAT BLD-MCNC: 0.93 MG/DL
EGFRCR SERPLBLD CKD-EPI 2021: 70 ML/MIN/1.73M2 (ref 60–?)
FASTING PATIENT LIPID ANSWER: NO
FASTING STATUS PATIENT QL REPORTED: NO
GLOBULIN PLAS-MCNC: 3.4 G/DL (ref 2.8–4.4)
GLUCOSE BLD-MCNC: 89 MG/DL (ref 70–99)
HDLC SERPL-MCNC: 78 MG/DL (ref 40–59)
LDLC SERPL CALC-MCNC: 81 MG/DL (ref ?–100)
NONHDLC SERPL-MCNC: 97 MG/DL (ref ?–130)
OSMOLALITY SERPL CALC.SUM OF ELEC: 290 MOSM/KG (ref 275–295)
POTASSIUM SERPL-SCNC: 4.6 MMOL/L (ref 3.5–5.1)
PROT SERPL-MCNC: 7.2 G/DL (ref 6.4–8.2)
SODIUM SERPL-SCNC: 140 MMOL/L (ref 136–145)
T4 FREE SERPL-MCNC: 1.8 NG/DL (ref 0.8–1.7)
TRIGL SERPL-MCNC: 91 MG/DL (ref 30–149)
TSI SER-ACNC: 0.02 MIU/ML (ref 0.36–3.74)
VLDLC SERPL CALC-MCNC: 14 MG/DL (ref 0–30)

## 2023-08-01 PROCEDURE — 36415 COLL VENOUS BLD VENIPUNCTURE: CPT

## 2023-08-01 PROCEDURE — 80053 COMPREHEN METABOLIC PANEL: CPT

## 2023-08-01 PROCEDURE — 84439 ASSAY OF FREE THYROXINE: CPT

## 2023-08-01 PROCEDURE — 80061 LIPID PANEL: CPT

## 2023-08-01 PROCEDURE — 84443 ASSAY THYROID STIM HORMONE: CPT

## 2023-08-04 ENCOUNTER — OFFICE VISIT (OUTPATIENT)
Dept: FAMILY MEDICINE CLINIC | Facility: CLINIC | Age: 58
End: 2023-08-04
Payer: COMMERCIAL

## 2023-08-04 ENCOUNTER — OFFICE VISIT (OUTPATIENT)
Dept: ENDOCRINOLOGY CLINIC | Facility: CLINIC | Age: 58
End: 2023-08-04

## 2023-08-04 VITALS
HEART RATE: 82 BPM | WEIGHT: 185 LBS | SYSTOLIC BLOOD PRESSURE: 105 MMHG | DIASTOLIC BLOOD PRESSURE: 70 MMHG | BODY MASS INDEX: 32 KG/M2

## 2023-08-04 VITALS
HEIGHT: 64 IN | DIASTOLIC BLOOD PRESSURE: 76 MMHG | BODY MASS INDEX: 31.58 KG/M2 | TEMPERATURE: 98 F | SYSTOLIC BLOOD PRESSURE: 102 MMHG | OXYGEN SATURATION: 98 % | RESPIRATION RATE: 16 BRPM | WEIGHT: 185 LBS | HEART RATE: 98 BPM

## 2023-08-04 DIAGNOSIS — E03.8 HYPOTHYROIDISM DUE TO HASHIMOTO'S THYROIDITIS: Primary | ICD-10-CM

## 2023-08-04 DIAGNOSIS — E66.9 OBESITY (BMI 30.0-34.9): ICD-10-CM

## 2023-08-04 DIAGNOSIS — J01.10 ACUTE FRONTAL SINUSITIS, RECURRENCE NOT SPECIFIED: Primary | ICD-10-CM

## 2023-08-04 DIAGNOSIS — E06.3 HYPOTHYROIDISM DUE TO HASHIMOTO'S THYROIDITIS: Primary | ICD-10-CM

## 2023-08-04 DIAGNOSIS — E27.1 ADRENAL INSUFFICIENCY (ADDISON'S DISEASE) (HCC): ICD-10-CM

## 2023-08-04 PROCEDURE — 99214 OFFICE O/P EST MOD 30 MIN: CPT | Performed by: INTERNAL MEDICINE

## 2023-08-04 PROCEDURE — 3078F DIAST BP <80 MM HG: CPT | Performed by: INTERNAL MEDICINE

## 2023-08-04 PROCEDURE — 3008F BODY MASS INDEX DOCD: CPT | Performed by: NURSE PRACTITIONER

## 2023-08-04 PROCEDURE — 3074F SYST BP LT 130 MM HG: CPT | Performed by: INTERNAL MEDICINE

## 2023-08-04 PROCEDURE — 3074F SYST BP LT 130 MM HG: CPT | Performed by: NURSE PRACTITIONER

## 2023-08-04 PROCEDURE — 99213 OFFICE O/P EST LOW 20 MIN: CPT | Performed by: NURSE PRACTITIONER

## 2023-08-04 PROCEDURE — 3078F DIAST BP <80 MM HG: CPT | Performed by: NURSE PRACTITIONER

## 2023-08-04 RX ORDER — HYDROCORTISONE 10 MG/1
TABLET ORAL
Qty: 90 TABLET | Refills: 1 | Status: SHIPPED | OUTPATIENT
Start: 2023-08-04

## 2023-08-04 RX ORDER — LEVOTHYROXINE SODIUM 100 UG/1
100 TABLET ORAL
Qty: 90 TABLET | Refills: 1 | Status: SHIPPED | OUTPATIENT
Start: 2023-08-04

## 2023-08-04 RX ORDER — HYDROCORTISONE 5 MG/1
TABLET ORAL
Qty: 360 TABLET | Refills: 2 | Status: SHIPPED | OUTPATIENT
Start: 2023-08-04

## 2023-08-04 RX ORDER — HYDROCORTISONE 20 MG/1
20 TABLET ORAL DAILY
Qty: 30 TABLET | Refills: 1 | Status: SHIPPED | OUTPATIENT
Start: 2023-08-04

## 2023-08-04 RX ORDER — CEFDINIR 300 MG/1
300 CAPSULE ORAL 2 TIMES DAILY
Qty: 14 CAPSULE | Refills: 0 | Status: SHIPPED | OUTPATIENT
Start: 2023-08-04 | End: 2023-08-11

## 2023-08-04 RX ORDER — SEMAGLUTIDE 2.4 MG/.75ML
2.4 INJECTION, SOLUTION SUBCUTANEOUS WEEKLY
Qty: 12 EACH | Refills: 1 | Status: SHIPPED | OUTPATIENT
Start: 2023-08-04

## 2023-08-04 RX ORDER — FLUDROCORTISONE ACETATE 0.1 MG/1
TABLET ORAL
Qty: 45 TABLET | Refills: 1 | Status: SHIPPED | OUTPATIENT
Start: 2023-08-04

## 2023-09-03 ENCOUNTER — PATIENT MESSAGE (OUTPATIENT)
Dept: ENDOCRINOLOGY CLINIC | Facility: CLINIC | Age: 58
End: 2023-09-03

## 2023-09-05 NOTE — TELEPHONE ENCOUNTER
Medication PA Requested: RYKBST 2.4mg/0.75mL                                                   CoverMyMeds Used:  Key:  Quantity: 12 each  Day Supply: 90 days  Sig:  Inject 0.75 mL (2.4 mg total) into the skin once a week.    DX Code:       E66.9

## 2023-09-05 NOTE — TELEPHONE ENCOUNTER
From: Yazan Granda  To: Shaylee Moeller MD  Sent: 9/3/2023 9:52 AM CDT  Subject: Mower Pack 2.4 refill    Hi- my insurance is asking for a pre authorization again before [facility] will refill it. Could you please authorize it for me?

## 2023-09-06 NOTE — TELEPHONE ENCOUNTER
Medication PA Requested: MDWPBL 2.4mg/0.75mL                                                   CoverMyMeds Used:  Key:[id number]  Quantity: 12 each  Day Supply: 90 days  Sig:  Inject 0.75 mL (2.4 mg total) into the skin once a week. DX Code:       E66.9                                Cmm pa submitted with OV 8/2023 & 11/2022    Was autoapproved,   Your prior authorization for [facility]IVON STEFFANY has been approved! MORE INFO  Personalized support and financial assistance may be available through the [employer] program. For more information, and to see program requirements, click on the More Info button to the right. Message from plan: CaseId:[id number];Status:Approved; Review Type:Prior Auth; Coverage Start Date:08/07/2023; Coverage End Date:09/05/2024;    [facility], [phone], spoke with Roxie Brannon, was changed to different [employer], need to call [phone]. Called [facility] in [place], [phone], spoke with Kolleen Krabbe, states went through.      My chart message sent to patient of approval.

## 2023-09-29 ENCOUNTER — OFFICE VISIT (OUTPATIENT)
Dept: FAMILY MEDICINE CLINIC | Facility: CLINIC | Age: 58
End: 2023-09-29
Payer: COMMERCIAL

## 2023-09-29 VITALS
TEMPERATURE: 98 F | HEART RATE: 70 BPM | SYSTOLIC BLOOD PRESSURE: 130 MMHG | OXYGEN SATURATION: 98 % | BODY MASS INDEX: 28.68 KG/M2 | RESPIRATION RATE: 18 BRPM | WEIGHT: 168 LBS | HEIGHT: 64 IN | DIASTOLIC BLOOD PRESSURE: 70 MMHG

## 2023-09-29 DIAGNOSIS — R35.0 URINE FREQUENCY: ICD-10-CM

## 2023-09-29 DIAGNOSIS — N39.0 ACUTE UTI (URINARY TRACT INFECTION): Primary | ICD-10-CM

## 2023-09-29 LAB
APPEARANCE: CLEAR
BILIRUBIN: NEGATIVE
GLUCOSE (URINE DIPSTICK): NEGATIVE MG/DL
KETONES (URINE DIPSTICK): NEGATIVE MG/DL
MULTISTIX LOT#: ABNORMAL NUMERIC
NITRITE, URINE: NEGATIVE
PH, URINE: 7.5 (ref 4.5–8)
SPECIFIC GRAVITY: 1.02 (ref 1–1.03)
URINE-COLOR: YELLOW
UROBILINOGEN,SEMI-QN: 1 MG/DL (ref 0–1.9)

## 2023-09-29 PROCEDURE — 3078F DIAST BP <80 MM HG: CPT

## 2023-09-29 PROCEDURE — 3008F BODY MASS INDEX DOCD: CPT

## 2023-09-29 PROCEDURE — 81003 URINALYSIS AUTO W/O SCOPE: CPT

## 2023-09-29 PROCEDURE — 87086 URINE CULTURE/COLONY COUNT: CPT

## 2023-09-29 PROCEDURE — 99213 OFFICE O/P EST LOW 20 MIN: CPT

## 2023-09-29 PROCEDURE — 3075F SYST BP GE 130 - 139MM HG: CPT

## 2023-09-29 RX ORDER — CEPHALEXIN 500 MG/1
500 CAPSULE ORAL 2 TIMES DAILY
Refills: 0 | Status: CANCELLED | OUTPATIENT
Start: 2023-09-29 | End: 2023-10-04

## 2023-09-29 RX ORDER — CEPHALEXIN 500 MG/1
500 CAPSULE ORAL 2 TIMES DAILY
Qty: 10 CAPSULE | Refills: 0 | Status: SHIPPED | OUTPATIENT
Start: 2023-09-29 | End: 2023-10-04

## 2024-01-04 ENCOUNTER — OFFICE VISIT (OUTPATIENT)
Dept: ENDOCRINOLOGY CLINIC | Facility: CLINIC | Age: 59
End: 2024-01-04
Payer: COMMERCIAL

## 2024-01-04 VITALS
HEIGHT: 64 IN | HEART RATE: 64 BPM | SYSTOLIC BLOOD PRESSURE: 114 MMHG | WEIGHT: 173.38 LBS | BODY MASS INDEX: 29.6 KG/M2 | DIASTOLIC BLOOD PRESSURE: 74 MMHG

## 2024-01-04 DIAGNOSIS — E55.9 VITAMIN D DEFICIENCY: ICD-10-CM

## 2024-01-04 DIAGNOSIS — E03.8 HYPOTHYROIDISM DUE TO HASHIMOTO'S THYROIDITIS: Primary | ICD-10-CM

## 2024-01-04 DIAGNOSIS — E06.3 HYPOTHYROIDISM DUE TO HASHIMOTO'S THYROIDITIS: Primary | ICD-10-CM

## 2024-01-04 DIAGNOSIS — E27.1 ADDISON'S DISEASE (HCC): ICD-10-CM

## 2024-01-04 PROCEDURE — 3078F DIAST BP <80 MM HG: CPT | Performed by: INTERNAL MEDICINE

## 2024-01-04 PROCEDURE — 3074F SYST BP LT 130 MM HG: CPT | Performed by: INTERNAL MEDICINE

## 2024-01-04 PROCEDURE — 3008F BODY MASS INDEX DOCD: CPT | Performed by: INTERNAL MEDICINE

## 2024-01-04 PROCEDURE — 99214 OFFICE O/P EST MOD 30 MIN: CPT | Performed by: INTERNAL MEDICINE

## 2024-01-04 NOTE — PROGRESS NOTES
Name: Alayna Fabian  Date: 1/4/2024    Referring Physician: No ref. provider found    Chief Complaint   Patient presents with    Hypothyroidism     Follow up        HISTORY OF PRESENT ILLNESS   Alayna Fabian is a 58 year old female who presents for   Chief Complaint   Patient presents with    Hypothyroidism     Follow up      57 y/o F presents for follow up evaluation of Addisons Disease diagnosed approximately 6 years ago.  She initially presented in ER at [facility] with adrenal crisis, severe nausea and vomiting.  +weight loss, +lightheadedness, +skin discoloration.    She is now maintained on Hydrocortisone 10mg PO QAM; 5mg PO noon; 2.5mg at 3pm (only taking this dose PRN).  She is also taking florinef 1/2 tablet in AM     She was diagnosed with hypothyroidism approximately 20 years ago.  Her TFTs were borderline for a few years and then started to develop significant symptoms.  She was having mental fogginess, weight gain, fatigue which improved once started on levothyroxine.  She is now maintained on Levothyroxine 100mcg PO daily.  She is taking medication in AM and waiting 30-60 min before eating.     She has been started on Wegovy therapy and she has been able to lose approximately 32lbs.  She is tolerating well.      She is a .     Diet: B: veggie smoothie; L: lean meat, vege; D: meat, salad, vege    REVIEW OF SYSTEMS  Eyes: no change in vision  Neurologic: no headache, generalized or focal weakness or numbness.  Head: normal  ENT: normal  Lungs: no shortness of breath, wheezing or STEINER  Cardiovascular:  no chest pain or palpitations  Gastrointestinal:  no abdominal pain, bowel movement problems  Musculoskeletal: no muscle pain or arthralgia  /Gyne: no frequency or discomfort while urinating  Psychiatric:  no acute distress, anxiety  or depression  Skin: normal moisturized skin    Medications:     Current Outpatient Medications:     LEVOXYL 100 MCG Oral Tab, Take 1 tablet (100 mcg  total) by mouth before breakfast., Disp: 90 tablet, Rfl: 1    fludrocortisone 0.1 MG Oral Tab, Take 1/2 tablet per day, Disp: 45 tablet, Rfl: 1    hydrocortisone 10 MG Oral Tab, Take 1 tablet daily in AM. Allow extra for stress dosing., Disp: 90 tablet, Rfl: 1    semaglutide-weight management (WEGOVY) 2.4 MG/0.75ML Subcutaneous Solution Auto-injector, Inject 0.75 mL (2.4 mg total) into the skin once a week., Disp: 12 each, Rfl: 1    hydrocortisone 20 MG Oral Tab, Take 1 tablet (20 mg total) by mouth daily., Disp: 30 tablet, Rfl: 1    hydrocortisone 5 MG Oral Tab, Take 4 tablets per day, Disp: 360 tablet, Rfl: 2    hydrocortisone 5 MG Oral Tab, Take 1 tablet (5 mg) at 12 pm and 1 tablet (5 mg) at 3 pm, Disp: 180 tablet, Rfl: 0    ondansetron (ZOFRAN) 4 mg tablet, Take 1 tablet (4 mg total) by mouth every 8 (eight) hours as needed for Nausea., Disp: 20 tablet, Rfl: 0     Allergies:   Allergies   Allergen Reactions    Clindamycin HIVES and UNKNOWN     [facility]ner Allergy Text Annotation: clindamycin      Nitrofurantoin OTHER (SEE COMMENTS), NAUSEA AND VOMITING and UNKNOWN     Headaches  [facility]ner Allergy Text Annotation: Macrobid      Dilaudid [Hydromorphone] UNKNOWN    Erythromycin UNKNOWN    Penicillins RASH    Sulfa Antibiotics RASH       Social History:   Social History     Socioeconomic History    Marital status: Single   Tobacco Use    Smoking status: Never    Smokeless tobacco: Never   Vaping Use    Vaping Use: Never used   Substance and Sexual Activity    Alcohol use: Yes    Drug use: Never       Medical History:   Past Medical History:   Diagnosis Date    [place]'s disease (HCC)     Asthma     Hypothyroidism        Surgical history:   Past Surgical History:   Procedure Laterality Date          HERNIA SURGERY         PHYSICAL EXAMINATION:  /74   Pulse 64   Ht 5' 4\" (1.626 m)   Wt 173 lb 6.4 oz (78.7 kg)   BMI 29.76 kg/m²     General Appearance:  Alert, in no acute distress, well developed  Eyes:  normal conjunctivae, sclera.  Ears/Nose/Mouth/Throat/Neck:  normal hearing, normal speech  Musculoskeletal:  normal muscle strength and tone  PV: normal pulses of carotids, pedals  Skin:  normal moisture and skin texture  Hair & Nails:  normal scalp hair     Neuro:  sensory grossly intact and motor grossly intact  Psychiatric:  oriented to time, self, and place  Nutritional:  no abnormal weight gain or loss    ASSESSMENT/PLAN:    1. Addisons Disease  - Discussed diagnosis with patient  - She is aware of sick day protocol  - Continue current dose of hydrocortisone  - Continue fludrocortisone  - Normal electrolytes   - Zofran PRN for acute illness  - Normal electrolytes     2. Hypothyroidism  - Continue Levoxyl 100mcg PO daily  - Discussed taking medication in AM and waiting 30-60 min before eating  - Recheck TSH, FT4    3. Vitamin D deficiency  -Normal level     4. Obesity  -Continue Wegovy therapy  - Congratulated patient on weight loss     RTC 6 months       1/4/2024  Jacque Pino MD

## 2024-01-29 RX ORDER — LEVOTHYROXINE SODIUM 100 UG/1
100 TABLET ORAL
Qty: 90 TABLET | Refills: 1 | Status: SHIPPED | OUTPATIENT
Start: 2024-01-29

## 2024-02-04 ENCOUNTER — OFFICE VISIT (OUTPATIENT)
Dept: FAMILY MEDICINE CLINIC | Facility: CLINIC | Age: 59
End: 2024-02-04
Payer: COMMERCIAL

## 2024-02-04 VITALS
WEIGHT: 175 LBS | OXYGEN SATURATION: 97 % | TEMPERATURE: 98 F | HEIGHT: 64 IN | BODY MASS INDEX: 29.88 KG/M2 | RESPIRATION RATE: 16 BRPM | HEART RATE: 73 BPM | DIASTOLIC BLOOD PRESSURE: 84 MMHG | SYSTOLIC BLOOD PRESSURE: 120 MMHG

## 2024-02-04 DIAGNOSIS — J02.9 SORE THROAT: Primary | ICD-10-CM

## 2024-02-04 LAB
CONTROL LINE PRESENT WITH A CLEAR BACKGROUND (YES/NO): YES YES/NO
KIT LOT #: NORMAL NUMERIC
STREP GRP A CUL-SCR: NEGATIVE

## 2024-02-04 PROCEDURE — 87880 STREP A ASSAY W/OPTIC: CPT | Performed by: PHYSICIAN ASSISTANT

## 2024-02-04 PROCEDURE — 87081 CULTURE SCREEN ONLY: CPT | Performed by: PHYSICIAN ASSISTANT

## 2024-02-04 PROCEDURE — 3008F BODY MASS INDEX DOCD: CPT | Performed by: PHYSICIAN ASSISTANT

## 2024-02-04 PROCEDURE — 3074F SYST BP LT 130 MM HG: CPT | Performed by: PHYSICIAN ASSISTANT

## 2024-02-04 PROCEDURE — 3079F DIAST BP 80-89 MM HG: CPT | Performed by: PHYSICIAN ASSISTANT

## 2024-02-04 PROCEDURE — 99213 OFFICE O/P EST LOW 20 MIN: CPT | Performed by: PHYSICIAN ASSISTANT

## 2024-02-04 NOTE — PROGRESS NOTES
CHIEF COMPLAINT:     Chief Complaint   Patient presents with    Sore Throat     Day w/ sore throat , hurts to swallow, white spots, headache, ear pain, and runny nose.        HPI:   Alayna Fabian is a 58 year old female presents to clinic with symptoms of sore throat. Patient has had since yesterday. Symptoms have been worsening since onset.  Patient reports following associated symptoms: white spots in throat, ear ache, runny nose, pain with swallowing. Patient reports headache. Patient denies stomach upset. Patient denies rash.  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 90 tablet 1    fludrocortisone 0.1 MG Oral Tab Take 1/2 tablet per day 45 tablet 1    hydrocortisone 10 MG Oral Tab Take 1 tablet daily in AM. Allow extra for stress dosing. 90 tablet 1    hydrocortisone 20 MG Oral Tab Take 1 tablet (20 mg total) by mouth daily. 30 tablet 1    hydrocortisone 5 MG Oral Tab Take 4 tablets per day 360 tablet 2    semaglutide-weight management (WEGOVY) 2.4 MG/0.75ML Subcutaneous Solution Auto-injector Inject 0.75 mL (2.4 mg total) into the skin once a week. 12 each 1    hydrocortisone 5 MG Oral Tab Take 1 tablet (5 mg) at 12 pm and 1 tablet (5 mg) at 3 pm 180 tablet 0    ondansetron (ZOFRAN) 4 mg tablet Take 1 tablet (4 mg total) by mouth every 8 (eight) hours as needed for Nausea. 20 tablet 0      Past Medical History:   Diagnosis Date    [place]'s disease (HCC)     Asthma     Hypothyroidism       Social History:  Social History     Socioeconomic History    Marital status: Single   Tobacco Use    Smoking status: Never    Smokeless tobacco: Never   Vaping Use    Vaping Use: Never used   Substance and Sexual Activity    Alcohol use: Yes     Drug use: Never        REVIEW OF SYSTEMS:   GENERAL HEALTH:  See HPI  SKIN: see HPI  HEENT: denies ear pain, See HPI  RESPIRATORY: denies shortness of breath, or wheezing  CARDIOVASCULAR: denies chest pain, palpitations   GI: denies abdominal pain, constipation and diarrhea  NEURO: denies dizziness or lightheadedness    EXAM:   /84   Pulse 73   Temp 97.9 °F (36.6 °C)   Resp 16   Ht 5' 4\" (1.626 m)   Wt 175 lb (79.4 kg)   SpO2 97%   BMI 30.04 kg/m²   GENERAL: well developed, well nourished,in no apparent distress  SKIN: no rashes,no suspicious lesions  HEAD: atraumatic, normocephalic  EYES: conjunctiva clear, EOM intact  EARS: TM's clear, non-injected, no bulging, retraction, or fluid  NOSE: nostrils patent, no exudates, nasal mucosa pink and noninflamed  THROAT: oral mucosa pink, moist. Posterior pharynx minimally erythematous and non injected. No exudates. Tonsils 0/4.  Breath non malodorous. No uvular deviation. No drooling.  NECK: supple  LUNGS: clear to auscultation bilaterally, no wheezes or rhonchi. Breathing is non labored.  CARDIO: RRR without murmur  GI: good BS's,no masses, hepatosplenomegaly, or tenderness on direct palpation  EXTREMITIES: no cyanosis or edema  LYMPH: Left sided anterior cervical. No submandibular lymphadenopathy.  No posterior cervical or occipital lymphadenopathy.    Recent Results (from the past 24 hour(s))   Rapid Strep    Collection Time: 02/04/24  9:25 AM   Result Value Ref Range    Strep Grp A Screen negative Negative    Control Line Present with a clear background (yes/no) yes Yes/No    Kit Lot # [id number] Numeric    Kit Expiration Date 04/22/2025 Date         ASSESSMENT AND PLAN:   Assessment:   Encounter Diagnosis   Name Primary?    Sore throat Yes         Plan:  Neg rapid strep, will send cx as sx only present ~1 day.     Offered COVID testing, patient declines at current time. Can do home test if sx progress    Comfort Measures discussed and listed in Patient  Instructions. OTC Tylenol/Motrin prn.   Push fluids- warm or cool liquids, whichever is soothing for patient  Warm salt water gargles 2 times per day for at least 3 days.    Do not share utensils or drinks with anyone.      Follow up with PCP if not improving, condition worsens, or fever greater than or equal to 100.4 persists for 72 hours.      The patient/parent indicates understanding of these issues and agrees to the plan.  The patient is asked to follow up with their PCP prn.

## 2024-04-01 RX ORDER — SEMAGLUTIDE 2.4 MG/.75ML
2.4 INJECTION, SOLUTION SUBCUTANEOUS WEEKLY
Qty: 12 EACH | Refills: 1 | Status: SHIPPED | OUTPATIENT
Start: 2024-04-01

## 2024-05-12 ENCOUNTER — PATIENT MESSAGE (OUTPATIENT)
Dept: ENDOCRINOLOGY CLINIC | Facility: CLINIC | Age: 59
End: 2024-05-12

## 2024-05-13 RX ORDER — FLUDROCORTISONE ACETATE 0.1 MG/1
TABLET ORAL
Qty: 45 TABLET | Refills: 1 | Status: SHIPPED | OUTPATIENT
Start: 2024-05-13

## 2024-05-13 NOTE — TELEPHONE ENCOUNTER
From: Alayna Fabian  To: Jacque Pino  Sent: 5/12/2024 7:41 AM CDT  Subject: Fludrocortizol    Good morning, I requested to refill my fludrocortizol medication through [employer] and [facility] informed that you denied the refill. I don’t understand. I thought I had to take this medicine. I am now completely out of my prescription and am experiencing dizziness. Can you please refill the prescription?

## 2024-05-15 RX ORDER — FLUDROCORTISONE ACETATE 0.1 MG/1
TABLET ORAL
Qty: 45 TABLET | Refills: 1 | Status: SHIPPED | OUTPATIENT
Start: 2024-05-15

## 2024-07-09 ENCOUNTER — LAB ENCOUNTER (OUTPATIENT)
Dept: LAB | Facility: HOSPITAL | Age: 59
End: 2024-07-09
Attending: INTERNAL MEDICINE
Payer: COMMERCIAL

## 2024-07-09 DIAGNOSIS — E06.3 HYPOTHYROIDISM DUE TO HASHIMOTO'S THYROIDITIS: ICD-10-CM

## 2024-07-09 DIAGNOSIS — E27.1 ADDISON'S DISEASE (HCC): ICD-10-CM

## 2024-07-09 DIAGNOSIS — E55.9 VITAMIN D DEFICIENCY: ICD-10-CM

## 2024-07-09 LAB
ANION GAP SERPL CALC-SCNC: 7 MMOL/L (ref 0–18)
BUN BLD-MCNC: 12 MG/DL (ref 9–23)
BUN/CREAT SERPL: 13.2 (ref 10–20)
CALCIUM BLD-MCNC: 10 MG/DL (ref 8.7–10.4)
CHLORIDE SERPL-SCNC: 106 MMOL/L (ref 98–112)
CO2 SERPL-SCNC: 26 MMOL/L (ref 21–32)
CREAT BLD-MCNC: 0.91 MG/DL
EGFRCR SERPLBLD CKD-EPI 2021: 72 ML/MIN/1.73M2 (ref 60–?)
FASTING STATUS PATIENT QL REPORTED: NO
GLUCOSE BLD-MCNC: 88 MG/DL (ref 70–99)
OSMOLALITY SERPL CALC.SUM OF ELEC: 287 MOSM/KG (ref 275–295)
POTASSIUM SERPL-SCNC: 4.8 MMOL/L (ref 3.5–5.1)
SODIUM SERPL-SCNC: 139 MMOL/L (ref 136–145)
T4 FREE SERPL-MCNC: 1.5 NG/DL (ref 0.8–1.7)
TSI SER-ACNC: 0.26 MIU/ML (ref 0.55–4.78)
VIT D+METAB SERPL-MCNC: 30.1 NG/ML (ref 30–100)

## 2024-07-09 PROCEDURE — 82306 VITAMIN D 25 HYDROXY: CPT

## 2024-07-09 PROCEDURE — 80048 BASIC METABOLIC PNL TOTAL CA: CPT

## 2024-07-09 PROCEDURE — 36415 COLL VENOUS BLD VENIPUNCTURE: CPT

## 2024-07-09 PROCEDURE — 84443 ASSAY THYROID STIM HORMONE: CPT

## 2024-07-09 PROCEDURE — 84439 ASSAY OF FREE THYROXINE: CPT

## 2024-07-11 ENCOUNTER — OFFICE VISIT (OUTPATIENT)
Dept: ENDOCRINOLOGY CLINIC | Facility: CLINIC | Age: 59
End: 2024-07-11

## 2024-07-11 VITALS
DIASTOLIC BLOOD PRESSURE: 77 MMHG | WEIGHT: 176 LBS | BODY MASS INDEX: 30.05 KG/M2 | HEIGHT: 64.02 IN | SYSTOLIC BLOOD PRESSURE: 105 MMHG | HEART RATE: 86 BPM

## 2024-07-11 DIAGNOSIS — E66.9 OBESITY (BMI 30.0-34.9): ICD-10-CM

## 2024-07-11 DIAGNOSIS — E06.3 HYPOTHYROIDISM DUE TO HASHIMOTO'S THYROIDITIS: ICD-10-CM

## 2024-07-11 DIAGNOSIS — E27.1 ADRENAL INSUFFICIENCY (ADDISON'S DISEASE) (HCC): Primary | ICD-10-CM

## 2024-07-11 PROCEDURE — 99214 OFFICE O/P EST MOD 30 MIN: CPT | Performed by: INTERNAL MEDICINE

## 2024-07-11 PROCEDURE — 3074F SYST BP LT 130 MM HG: CPT | Performed by: INTERNAL MEDICINE

## 2024-07-11 PROCEDURE — 3008F BODY MASS INDEX DOCD: CPT | Performed by: INTERNAL MEDICINE

## 2024-07-11 PROCEDURE — 3078F DIAST BP <80 MM HG: CPT | Performed by: INTERNAL MEDICINE

## 2024-07-11 RX ORDER — HYDROCORTISONE 20 MG/1
20 TABLET ORAL DAILY
Qty: 30 TABLET | Refills: 1 | Status: SHIPPED | OUTPATIENT
Start: 2024-07-11

## 2024-07-11 RX ORDER — FLUDROCORTISONE ACETATE 0.1 MG/1
TABLET ORAL
Qty: 45 TABLET | Refills: 1 | Status: SHIPPED | OUTPATIENT
Start: 2024-07-11

## 2024-07-11 RX ORDER — HYDROCORTISONE 5 MG/1
TABLET ORAL
Qty: 180 TABLET | Refills: 0 | Status: SHIPPED | OUTPATIENT
Start: 2024-07-11

## 2024-07-11 RX ORDER — HYDROCORTISONE 10 MG/1
TABLET ORAL
Qty: 90 TABLET | Refills: 1 | Status: SHIPPED | OUTPATIENT
Start: 2024-07-11

## 2024-07-11 NOTE — PROGRESS NOTES
Name: Alayna Fabian  Date: 7/11/2024    Referring Physician: No ref. provider found    Chief Complaint   Patient presents with    Hypothyroidism     Pt is in for a Thyroid follow up       HISTORY OF PRESENT ILLNESS   Alayna Fabian is a 59 year old female who presents for   Chief Complaint   Patient presents with    Hypothyroidism     Pt is in for a Thyroid follow up     58 y/o F presents for follow up evaluation of Addisons Disease diagnosed approximately 6 years ago.  She initially presented in ER at [facility] with adrenal crisis, severe nausea and vomiting.  +weight loss, +lightheadedness, +skin discoloration.    She is now maintained on Hydrocortisone 10mg PO QAM; 5mg PO noon; 2.5mg at 3pm (only taking this dose PRN).  Over the summer she is taking Hydrocortisone 5mg 4 times per day. She is also taking florinef 1/2 tablet in AM     She was diagnosed with hypothyroidism approximately 20 years ago.  Her TFTs were borderline for a few years and then started to develop significant symptoms.  She was having mental fogginess, weight gain, fatigue which improved once started on levothyroxine.  She is now maintained on Levothyroxine 100mcg PO daily.  She is taking medication in AM and waiting 30-60 min before eating.     She has been started on Wegovy therapy and she has been able to lose approximately 32lbs.  She is tolerating well.      She is a .     Diet: B: veggie smoothie; L: lean meat, vege; D: meat, salad, vege    REVIEW OF SYSTEMS  Eyes: no change in vision  Neurologic: no headache, generalized or focal weakness or numbness.  Head: normal  ENT: normal  Lungs: no shortness of breath, wheezing or STEINER  Cardiovascular:  no chest pain or palpitations  Gastrointestinal:  no abdominal pain, bowel movement problems  Musculoskeletal: no muscle pain or arthralgia  /Gyne: no frequency or discomfort while urinating  Psychiatric:  no acute distress, anxiety  or depression  Skin: normal moisturized  skin    Medications:     Current Outpatient Medications:     LEVOXYL 100 MCG Oral Tab, Take 1 tablet (100 mcg total) by mouth before breakfast., Disp: 90 tablet, Rfl: 1    fludrocortisone 0.1 MG Oral Tab, Take 1/2 tablet per day, Disp: 45 tablet, Rfl: 1    fludrocortisone 0.1 MG Oral Tab, Take 1/2 tablet per day, Disp: 45 tablet, Rfl: 1    semaglutide-weight management (WEGOVY) 2.4 MG/0.75ML Subcutaneous Solution Auto-injector, Inject 0.75 mL (2.4 mg total) into the skin once a week., Disp: 12 each, Rfl: 1    hydrocortisone 10 MG Oral Tab, Take 1 tablet daily in AM. Allow extra for stress dosing., Disp: 90 tablet, Rfl: 1    hydrocortisone 20 MG Oral Tab, Take 1 tablet (20 mg total) by mouth daily., Disp: 30 tablet, Rfl: 1    hydrocortisone 5 MG Oral Tab, Take 4 tablets per day, Disp: 360 tablet, Rfl: 2    hydrocortisone 5 MG Oral Tab, Take 1 tablet (5 mg) at 12 pm and 1 tablet (5 mg) at 3 pm, Disp: 180 tablet, Rfl: 0    ondansetron (ZOFRAN) 4 mg tablet, Take 1 tablet (4 mg total) by mouth every 8 (eight) hours as needed for Nausea., Disp: 20 tablet, Rfl: 0     Allergies:   Allergies   Allergen Reactions    Clindamycin HIVES and UNKNOWN     Cerner Allergy Text Annotation: clindamycin      Nitrofurantoin OTHER (SEE COMMENTS), NAUSEA AND VOMITING and UNKNOWN     Headaches  Cerner Allergy Text Annotation: Macrobid      Dilaudid [Hydromorphone] UNKNOWN    Erythromycin UNKNOWN    Penicillins RASH    Sulfa Antibiotics RASH       Social History:   Social History     Socioeconomic History    Marital status: Single   Tobacco Use    Smoking status: Never    Smokeless tobacco: Never   Vaping Use    Vaping status: Never Used   Substance and Sexual Activity    Alcohol use: Yes    Drug use: Never       Medical History:   Past Medical History:    Gary's disease (HCC)    Asthma (HCC)    Hypothyroidism       Surgical history:   Past Surgical History:   Procedure Laterality Date          Hernia surgery         PHYSICAL  EXAMINATION:  /77   Pulse 86   Ht 5' 4.02\" (1.626 m)   Wt 176 lb (79.8 kg)   BMI 30.20 kg/m²     General Appearance:  Alert, in no acute distress, well developed  Eyes: normal conjunctivae, sclera.  Ears/Nose/Mouth/Throat/Neck:  normal hearing, normal speech  Musculoskeletal:  normal muscle strength and tone  PV: normal pulses of carotids, pedals  Skin:  normal moisture and skin texture  Hair & Nails:  normal scalp hair     Neuro:  sensory grossly intact and motor grossly intact  Psychiatric:  oriented to time, self, and place  Nutritional:  no abnormal weight gain or loss    ASSESSMENT/PLAN:    1. Addisons Disease  - Discussed diagnosis with patient  - She is aware of sick day protocol  - Continue current dose of hydrocortisone  - Continue fludrocortisone  - Normal electrolytes   - Zofran PRN for acute illness  - Normal electrolytes     2. Hypothyroidism  - Continue Levoxyl 100mcg PO daily  - Discussed taking medication in AM and waiting 30-60 min before eating  - Normal TFTs     3. Vitamin D deficiency  -Normal level     4. Obesity  -Continue Wegovy therapy  - Congratulated patient on weight loss   - Will consider change to Zepbound in the future     RTC 6 months       7/11/2024  Jacque Pino MD

## 2024-08-09 NOTE — TELEPHONE ENCOUNTER
Endocrine refill protocol for medications for hypothyroidism and hyperthyroidism    Protocol Criteria:  PASSED  Appointment with Endocrinology completed in the last 12 months or scheduled in the next 6 months     Verify appointment has been completed or scheduled in the appropriate timeline. If so can send a 90 day supply with 1 refill per provider protocol.    Normal TSH result in the past 12 months   Review recent telephone encounters and mychart communications with patient to ensure a dose change has not occurred since last office visit that was not updated in the medication history list   Last completed office visit:7/11/2024 Jacque Pino MD   Next scheduled Follow up: 1/17/25     Last TSH result:   TSH   Date Value Ref Range Status   07/09/2024 0.263 (L) 0.550 - 4.780 mIU/mL Final

## 2024-08-09 NOTE — TELEPHONE ENCOUNTER
Medication Quantity Refills Start End   LEVOXYL 100 MCG Oral Tab 90 tablet 1 1/29/2024 --   Sig:   Take 1 tablet (100 mcg total) by mouth before breakfast.     Route:   Oral         Refill request

## 2024-08-10 RX ORDER — LEVOTHYROXINE SODIUM 100 UG/1
100 TABLET ORAL
Qty: 90 TABLET | Refills: 0 | Status: SHIPPED | OUTPATIENT
Start: 2024-08-10

## 2024-11-02 NOTE — TELEPHONE ENCOUNTER
Endocrine refill protocol for medications for hypothyroidism and hyperthyroidism    Protocol Criteria:  FAILED Reason: Abnormal labs    If all below requirements are met, send a 90-day supply with 1 refill per provider protocol.    Verify appointment with Endocrinology completed in the last 12 months or scheduled in the next 6 months.    Normal TSH result in the past 12 months   Review recent telephone encounters and mychart communications with patient to ensure a dose change has not occurred since last office visit that was not updated in the medication history list     Last completed office visit:7/11/2024 Jacque Pino MD   Next scheduled Follow up:   Future Appointments   Date Time Provider Department Center   1/17/2025  7:30 AM Jacque Pino MD [facility]      Last TSH result:   TSH   Date Value Ref Range Status   07/09/2024 0.263 (L) 0.550 - 4.780 mIU/mL Final

## 2024-11-02 NOTE — TELEPHONE ENCOUNTER
Endocrine Refill protocol for weight management    Protocol Criteria:  PASSED Reason: N/A    If below requirement is met, send a 90-day supply with 1 refill per provider protocol.    Verify appointment with Endocrinology completed in the last 6 months or scheduled in the next 3 months.    Last completed office visit:7/11/2024 Jacque Pino MD   Next scheduled Follow up:   Future Appointments   Date Time Provider Department Center   1/17/2025  7:30 AM Jacque Pino MD [facility]

## 2024-11-04 RX ORDER — SEMAGLUTIDE 2.4 MG/.75ML
2.4 INJECTION, SOLUTION SUBCUTANEOUS WEEKLY
Qty: 12 EACH | Refills: 1 | Status: SHIPPED | OUTPATIENT
Start: 2024-11-04

## 2024-11-04 RX ORDER — LEVOTHYROXINE SODIUM 100 UG/1
100 TABLET ORAL
Qty: 90 TABLET | Refills: 0 | Status: SHIPPED | OUTPATIENT
Start: 2024-11-04

## 2024-11-16 ENCOUNTER — OFFICE VISIT (OUTPATIENT)
Dept: FAMILY MEDICINE CLINIC | Facility: CLINIC | Age: 59
End: 2024-11-16
Payer: COMMERCIAL

## 2024-11-16 VITALS
HEIGHT: 64 IN | TEMPERATURE: 97 F | OXYGEN SATURATION: 99 % | HEART RATE: 70 BPM | SYSTOLIC BLOOD PRESSURE: 121 MMHG | WEIGHT: 181 LBS | DIASTOLIC BLOOD PRESSURE: 70 MMHG | RESPIRATION RATE: 16 BRPM | BODY MASS INDEX: 30.9 KG/M2

## 2024-11-16 DIAGNOSIS — R39.9 UTI SYMPTOMS: Primary | ICD-10-CM

## 2024-11-16 LAB
BILIRUBIN: NEGATIVE
GLUCOSE (URINE DIPSTICK): NEGATIVE MG/DL
KETONES (URINE DIPSTICK): NEGATIVE MG/DL
MULTISTIX LOT#: ABNORMAL NUMERIC
NITRITE, URINE: NEGATIVE
PH, URINE: 5.5 (ref 4.5–8)
SPECIFIC GRAVITY: 1.02 (ref 1–1.03)
URINE-COLOR: YELLOW
UROBILINOGEN,SEMI-QN: 0.2 MG/DL (ref 0–1.9)

## 2024-11-16 PROCEDURE — 3074F SYST BP LT 130 MM HG: CPT | Performed by: NURSE PRACTITIONER

## 2024-11-16 PROCEDURE — 87088 URINE BACTERIA CULTURE: CPT | Performed by: NURSE PRACTITIONER

## 2024-11-16 PROCEDURE — 81003 URINALYSIS AUTO W/O SCOPE: CPT | Performed by: NURSE PRACTITIONER

## 2024-11-16 PROCEDURE — 3008F BODY MASS INDEX DOCD: CPT | Performed by: NURSE PRACTITIONER

## 2024-11-16 PROCEDURE — 87186 SC STD MICRODIL/AGAR DIL: CPT | Performed by: NURSE PRACTITIONER

## 2024-11-16 PROCEDURE — 3078F DIAST BP <80 MM HG: CPT | Performed by: NURSE PRACTITIONER

## 2024-11-16 PROCEDURE — 87086 URINE CULTURE/COLONY COUNT: CPT | Performed by: NURSE PRACTITIONER

## 2024-11-16 PROCEDURE — 99213 OFFICE O/P EST LOW 20 MIN: CPT | Performed by: NURSE PRACTITIONER

## 2024-11-16 RX ORDER — CEPHALEXIN 500 MG/1
500 CAPSULE ORAL 2 TIMES DAILY
Qty: 14 CAPSULE | Refills: 0 | Status: SHIPPED | OUTPATIENT
Start: 2024-11-16 | End: 2024-11-23

## 2024-11-16 NOTE — PATIENT INSTRUCTIONS
1. Take Keflex as prescribed.  2. Drink plenty of fluids to keep well-hydrated.   3. Ensure that you urinate frequently, emptying your bladder completely each time.   4. Follow up with primary care physician in the next 1-2 weeks.   5. Return to care sooner if symptoms do not improve in the next 2-3 days or you develop fever, flank pain, increased blood in urine, inability to urinate.  6. Take AZO Urinary Relief OTC as directed. Take after meals. Do not take this medication for more than 2 days time.  7. We will notify you of urine culture results in the next few days. At this time we will suggest to either discontinue, change, or continue the medication.

## 2024-11-16 NOTE — PROGRESS NOTES
Subjective:   Patient ID: Alayna Fabian is a 59 year old female.    Patient is a 59 year old female who presents today with complaints of urinary frequency, urgency, dysuria, suprapubic pressure, fatigue, urinary odor and cloudy urine x 4 days. Is starting to develop some nausea. Also thinks she notices some blood in her urine. Denies fevers, body aches, chills, vomiting, diarrhea, abdominal pain, flank pain, headaches, vaginal bleeding, vaginal discharge, concern for STI's. Decreased appetite, tolerating fluids. Attempted treatment prior to arrival = cranberry juice.         History/Other:   Review of Systems   Constitutional:  Positive for appetite change and fatigue. Negative for chills and fever.   Gastrointestinal:  Positive for nausea. Negative for abdominal pain, diarrhea and vomiting.   Genitourinary:  Positive for dysuria, frequency, hematuria and urgency. Negative for flank pain, pelvic pain (suprapubic pressure), vaginal bleeding and vaginal discharge.   Musculoskeletal:  Negative for myalgias.   Neurological:  Negative for headaches.     Current Outpatient Medications   Medication Sig Dispense Refill    cephALEXin (KEFLEX) 500 MG Oral Cap Take 1 capsule (500 mg total) by mouth 2 (two) times daily for 7 days. 14 capsule 0    semaglutide-weight management (WEGOVY) 2.4 MG/0.75ML Subcutaneous Solution Auto-injector Inject 0.75 mL (2.4 mg total) into the skin once a week. 12 each 1    LEVOXYL 100 MCG Oral Tab Take 1 tablet (100 mcg total) by mouth before breakfast. 90 tablet 0    hydrocortisone 5 MG Oral Tab Take 1 tablet (5 mg) at 12 pm and 1 tablet (5 mg) at 3 pm 180 tablet 0    hydrocortisone 10 MG Oral Tab Take 1 tablet daily in AM. Allow extra for stress dosing. 90 tablet 1    fludrocortisone 0.1 MG Oral Tab Take 1/2 tablet per day 45 tablet 1    hydrocortisone 20 MG Oral Tab Take 1 tablet (20 mg total) by mouth daily. 30 tablet 1    fludrocortisone 0.1 MG Oral Tab Take 1/2 tablet per day 45 tablet 1     hydrocortisone 5 MG Oral Tab Take 4 tablets per day 360 tablet 2    ondansetron (ZOFRAN) 4 mg tablet Take 1 tablet (4 mg total) by mouth every 8 (eight) hours as needed for Nausea. 20 tablet 0    Ergocalciferol (VITAMIN D2 OR) Take 3,000 mcg by mouth.       Allergies:Allergies[1]    /70   Pulse 70   Temp 97.3 °F (36.3 °C) (Tympanic)   Resp 16   Ht 5' 4\" (1.626 m)   Wt 181 lb (82.1 kg)   SpO2 99%   BMI 31.07 kg/m²       Objective:   Physical Exam  Vitals reviewed.   Constitutional:       General: She is awake. She is not in acute distress.     Appearance: Normal appearance. She is well-developed and well-groomed. She is not ill-appearing, toxic-appearing or diaphoretic.   HENT:      Head: Normocephalic and atraumatic.   Cardiovascular:      Rate and Rhythm: Normal rate and regular rhythm.      Heart sounds: Murmur heard.   Pulmonary:      Effort: Pulmonary effort is normal. No respiratory distress.      Breath sounds: Normal breath sounds and air entry. No decreased breath sounds, wheezing, rhonchi or rales.   Abdominal:      Tenderness: There is no right CVA tenderness or left CVA tenderness.   Skin:     General: Skin is warm and dry.   Neurological:      Mental Status: She is alert and oriented to person, place, and time.   Psychiatric:         Behavior: Behavior is cooperative.         Assessment & Plan:   1. UTI symptoms        Orders Placed This Encounter   Procedures    Urine Dip, auto without Micro    Urine Culture, Routine [E]     Results for orders placed or performed in visit on 11/16/24   Urine Dip, auto without Micro    Collection Time: 11/16/24  9:11 AM   Result Value Ref Range    Glucose Urine Negative Negative mg/dL    Bilirubin Urine Negative Negative    Ketones, UA Negative Negative - Trace mg/dL    Spec Gravity 1.025 1.005 - 1.030    Blood Urine Moderate (A) Negative    PH Urine 5.5 5.0 - 8.0    Protein Urine Trace Negative - Trace mg/dL    Urobilinogen Urine 0.2 0.2 - 1.0 mg/dL     Nitrite Urine Negative Negative    Leukocyte Esterase Urine Small (A) Negative    APPEARANCE cloudy Clear    Color Urine yellow Yellow    Multistix Lot# [id number] Numeric    Multistix Expiration Date 5/31/25 Date         Meds This Visit:  Requested Prescriptions     Signed Prescriptions Disp Refills    cephALEXin (KEFLEX) 500 MG Oral Cap 14 capsule 0     Sig: Take 1 capsule (500 mg total) by mouth 2 (two) times daily for 7 days.     Reviewed POC test results with patient.  Urine cx collected and sent. Will notify of results.  Reassuring physical exam findings. Vitals WNL.   START Keflex today. Has tolerated in the past.   Supportive care and return to care measures reviewed.  Patient v/u and is comfortable with this plan.      Patient Instructions   1. Take Keflex as prescribed.  2. Drink plenty of fluids to keep well-hydrated.   3. Ensure that you urinate frequently, emptying your bladder completely each time.   4. Follow up with primary care physician in the next 1-2 weeks.   5. Return to care sooner if symptoms do not improve in the next 2-3 days or you develop fever, flank pain, increased blood in urine, inability to urinate.  6. Take AZO Urinary Relief OTC as directed. Take after meals. Do not take this medication for more than 2 days time.  7. We will notify you of urine culture results in the next few days. At this time we will suggest to either discontinue, change, or continue the medication.          [1]   Allergies  Allergen Reactions    Clindamycin HIVES and UNKNOWN     Cerner Allergy Text Annotation: clindamycin      Nitrofurantoin OTHER (SEE COMMENTS), NAUSEA AND VOMITING and UNKNOWN     Headaches  Cerner Allergy Text Annotation: Macrobid      Dilaudid [Hydromorphone] UNKNOWN    Erythromycin UNKNOWN    Penicillins RASH    Sulfa Antibiotics RASH

## 2024-12-02 RX ORDER — HYDROCORTISONE 5 MG/1
TABLET ORAL
Qty: 180 TABLET | Refills: 0 | Status: SHIPPED | OUTPATIENT
Start: 2024-12-02

## 2024-12-02 RX ORDER — LEVOTHYROXINE SODIUM 100 UG/1
100 TABLET ORAL
Qty: 90 TABLET | Refills: 0 | Status: SHIPPED | OUTPATIENT
Start: 2024-12-02

## 2024-12-02 NOTE — TELEPHONE ENCOUNTER
Endocrine refill protocol for medications for hypothyroidism and hyperthyroidism    Protocol Criteria:  FAILED Reason: Abnormal labs    If all below requirements are met, send a 90-day supply with 1 refill per provider protocol.    Verify appointment with Endocrinology completed in the last 12 months or scheduled in the next 6 months.    Normal TSH result in the past 12 months   Review recent telephone encounters and mychart communications with patient to ensure a dose change has not occurred since last office visit that was not updated in the medication history list     Last completed office visit:7/11/2024 Jacque Pino MD   Next scheduled Follow up:   Future Appointments   Date Time Provider Department Center   3/3/2025  3:15 PM Jacque Pino MD [facility]      Last TSH result:   TSH   Date Value Ref Range Status   07/09/2024 0.263 (L) 0.550 - 4.780 mIU/mL Final

## 2024-12-02 NOTE — TELEPHONE ENCOUNTER
Endocrine Refill protocol for oral medications    Protocol Criteria:  PASSED  Reason: N/A    If below requirement is met, send a 90-day supply with 1 refill per provider protocol.    Verify appointment with Endocrinology completed in the last 6 months or scheduled in the next 3 months.    Last completed office visit: 7/11/2024 Jacque Pino MD   Next scheduled Follow up:   Future Appointments   Date Time Provider Department Center   3/3/2025  3:15 PM [facility]

## 2024-12-27 ENCOUNTER — PATIENT MESSAGE (OUTPATIENT)
Dept: ENDOCRINOLOGY CLINIC | Facility: CLINIC | Age: 59
End: 2024-12-27

## 2024-12-27 DIAGNOSIS — E27.1 ADRENAL INSUFFICIENCY (ADDISON'S DISEASE) (HCC): Primary | ICD-10-CM

## 2024-12-27 DIAGNOSIS — E27.1 ADDISON'S DISEASE (HCC): ICD-10-CM

## 2024-12-30 RX ORDER — ONDANSETRON 4 MG/1
4 TABLET, FILM COATED ORAL EVERY 8 HOURS PRN
Qty: 20 TABLET | Refills: 0 | OUTPATIENT
Start: 2024-12-30

## 2024-12-30 RX ORDER — HYDROCORTISONE 10 MG/1
TABLET ORAL
Qty: 180 TABLET | Refills: 1 | Status: SHIPPED | OUTPATIENT
Start: 2024-12-30

## 2024-12-30 RX ORDER — HYDROCORTISONE 5 MG/1
TABLET ORAL
Qty: 180 TABLET | Refills: 0 | OUTPATIENT
Start: 2024-12-30

## 2024-12-30 RX ORDER — HYDROCORTISONE 20 MG/1
20 TABLET ORAL DAILY
Qty: 30 TABLET | Refills: 1 | Status: SHIPPED | OUTPATIENT
Start: 2024-12-30

## 2024-12-30 RX ORDER — HYDROCORTISONE 20 MG/1
20 TABLET ORAL DAILY
Qty: 30 TABLET | Refills: 1 | OUTPATIENT
Start: 2024-12-30

## 2024-12-30 RX ORDER — ONDANSETRON 4 MG/1
4 TABLET, FILM COATED ORAL EVERY 8 HOURS PRN
Qty: 20 TABLET | Refills: 0 | Status: SHIPPED | OUTPATIENT
Start: 2024-12-30

## 2024-12-30 NOTE — TELEPHONE ENCOUNTER
Called the patient and provided her with Dr. Driscoll's instructions below. Patient verbalized her understanding of all instructions and states she will contact the office on Friday 1/3/25 with a condition update.

## 2024-12-30 NOTE — TELEPHONE ENCOUNTER
HI!    Please ask her to double her dose for about 3-4 days and see how she feels. Please ask her to foollow up with us. Thank you!

## 2024-12-30 NOTE — TELEPHONE ENCOUNTER
LOV 7/11/2024 with Dr. Pino. Patient seen for treatment of ann's disease, hypothyroidism, obesity.     Called the patient to triage. She states that she recently had 12 people over for the holidays and they all got stomach flu like symptoms. Starting Friday Alayna was having severe persistent vomiting and diarrhea. She was also becoming dizzy and very cold unable to regulate her body temperature. Zofran was not persistently helping so she went to ER in [place]. They gave her IV steroids and IV zofran. And discharged her after she was feeling better. Over the weekend she continued her normal doses and rested in bed. She is taking wegovy but this is not a new prescription and she did not miss doses. She feels the illness is from the holidays.     Today she is feeling 40% better as far as energy level and still having some mild dizziness with position changes. Denies fever, shaking, muscle spasms, irregular heart rate. No more vomiting or diarrhea.     Currently taking Hydrocortisone 10 mg PO QAM, 5 mg PO at noon, and 5 mg PO at 3pm. Also taking fludrocortisone 0.1 mg one half tablet PO daily.     Patient asking if she should continue regular doses today or if doses should be changed temporarily. Asking for refill for hydrocortisone 10 mg tablets.

## 2024-12-30 NOTE — TELEPHONE ENCOUNTER
Per lov \"Zofran PRN for acute illness\"      Endocrine Refill protocol for oral medications    Protocol Criteria:  PASSED  Reason: N/A    If below requirement is met, send a 90-day supply with 1 refill per provider protocol.    Verify appointment with Endocrinology completed in the last 6 months or scheduled in the next 3 months.    Last completed office visit: 7/11/2024 Jacque Pino MD   Next scheduled Follow up:   Future Appointments   Date Time Provider Department Center   3/3/2025  3:15 PM Jacque Pino MD [facility]

## 2025-01-07 DIAGNOSIS — E27.1 ADDISON'S DISEASE (HCC): ICD-10-CM

## 2025-01-07 DIAGNOSIS — E27.1 ADRENAL INSUFFICIENCY (ADDISON'S DISEASE) (HCC): ICD-10-CM

## 2025-01-08 RX ORDER — HYDROCORTISONE 10 MG/1
TABLET ORAL
Qty: 90 TABLET | Refills: 0 | OUTPATIENT
Start: 2025-01-08

## 2025-01-26 NOTE — TELEPHONE ENCOUNTER
From: Oswaldo Granda  To: Fransico Patiño MD  Sent: 4/1/2023 8:16 AM CDT  Subject: Hydrocortisone     Good morning- in the past I have been approved for hydrocortisone 5 mg, 10 mg and also 20 mg. I requested all 3. I see that you approved 20 mg. Could I get 10 mg and 5 mg please?
LOV 3/31/23  FU 8/4/23
Opt out

## 2025-02-21 NOTE — TELEPHONE ENCOUNTER
Prior Authorization needed - QTY: 9    Plan does not cover this medication.  Please call plan at [phone] to initiate prior authorization or call/fax pharmacy to change medication.  Patient ID# is [id number].    Current Outpatient Medications   Medication Sig Dispense Refill                                              semaglutide-weight management (WEGOVY) 2.4 MG/0.75ML Subcutaneous Solution Auto-injector Inject 0.75 mL (2.4 mg total) into the skin once a week. 12 each 1

## 2025-02-24 NOTE — TELEPHONE ENCOUNTER
Received fax from [employer] requesting name to be corrected on orde. Faxed new order with correct information. Received confirmation.

## 2025-02-25 NOTE — TELEPHONE ENCOUNTER
Medication Quantity Refills Start End   semaglutide-weight management (WEGOVY) 2.4 MG/0.75ML Subcutaneous Solution Auto-injector 12 each 1 11/4/2024 --   Sig:   Inject 0.75 mL (2.4 mg total) into the skin once a week.     Route:   Subcutaneous     Order #:   [id number]       Prior Authorization Needed      Plan does not cover this medication.   Please call plan at [phone] to initiate prior auth or call/fax pharmacy to change meds. Patient ID# is [id number]

## 2025-02-26 RX ORDER — SEMAGLUTIDE 2.4 MG/.75ML
2.4 INJECTION, SOLUTION SUBCUTANEOUS WEEKLY
Qty: 12 EACH | Refills: 0 | Status: CANCELLED | OUTPATIENT
Start: 2025-02-26

## 2025-02-26 NOTE — TELEPHONE ENCOUNTER
Received duplicate fax from [employer], a 'Prior Authorization' form for the Wegovy 2.4 MG. PA form was filled out and faxed to [employer] at [phone].

## 2025-02-27 ENCOUNTER — TELEPHONE (OUTPATIENT)
Dept: ENDOCRINOLOGY CLINIC | Facility: CLINIC | Age: 60
End: 2025-02-27

## 2025-02-27 RX ORDER — SEMAGLUTIDE 2.4 MG/.75ML
2.4 INJECTION, SOLUTION SUBCUTANEOUS WEEKLY
Qty: 12 EACH | Refills: 1 | Status: SHIPPED | OUTPATIENT
Start: 2025-02-27 | End: 2025-03-03

## 2025-02-27 NOTE — TELEPHONE ENCOUNTER
Endocrine Refill protocol for weight management    Protocol Criteria:  PASSED Reason: N/A    If below requirement is met, send a 90-day supply with 1 refill per provider protocol.    Verify appointment with Endocrinology completed in the last 6 months or scheduled in the next 3 months.    Last completed office visit:7/11/2024 Jacque Pino MD   Next scheduled Follow up:   Future Appointments   Date Time Provider Department Center   3/3/2025  3:15 PM [facility]

## 2025-02-27 NOTE — TELEPHONE ENCOUNTER
Patient is returning call to reschedule.   She is scheduled to follow up on 3/3/25 and was told Provider is not available.  She declined to schedule first available.  She states that she has a life threatening disease and this is the second time the MD has reschedule an appointment.   Patient is requesting to speak with an RN.

## 2025-03-03 ENCOUNTER — OFFICE VISIT (OUTPATIENT)
Dept: ENDOCRINOLOGY CLINIC | Facility: CLINIC | Age: 60
End: 2025-03-03

## 2025-03-03 ENCOUNTER — TELEPHONE (OUTPATIENT)
Dept: ENDOCRINOLOGY CLINIC | Facility: CLINIC | Age: 60
End: 2025-03-03

## 2025-03-03 VITALS
WEIGHT: 181 LBS | DIASTOLIC BLOOD PRESSURE: 89 MMHG | BODY MASS INDEX: 31 KG/M2 | SYSTOLIC BLOOD PRESSURE: 122 MMHG | HEART RATE: 77 BPM

## 2025-03-03 DIAGNOSIS — E55.9 VITAMIN D DEFICIENCY: ICD-10-CM

## 2025-03-03 DIAGNOSIS — E66.811 OBESITY (BMI 30.0-34.9): ICD-10-CM

## 2025-03-03 DIAGNOSIS — E06.3 HYPOTHYROIDISM DUE TO HASHIMOTO'S THYROIDITIS: ICD-10-CM

## 2025-03-03 DIAGNOSIS — E27.1 ADRENAL INSUFFICIENCY (ADDISON'S DISEASE) (HCC): Primary | ICD-10-CM

## 2025-03-03 PROCEDURE — 99214 OFFICE O/P EST MOD 30 MIN: CPT | Performed by: INTERNAL MEDICINE

## 2025-03-03 PROCEDURE — 3079F DIAST BP 80-89 MM HG: CPT | Performed by: INTERNAL MEDICINE

## 2025-03-03 PROCEDURE — 3074F SYST BP LT 130 MM HG: CPT | Performed by: INTERNAL MEDICINE

## 2025-03-03 RX ORDER — TIRZEPATIDE 10 MG/.5ML
10 INJECTION, SOLUTION SUBCUTANEOUS WEEKLY
Qty: 6 ML | Refills: 1 | Status: SHIPPED | OUTPATIENT
Start: 2025-03-03

## 2025-03-03 NOTE — PROGRESS NOTES
Name: Alayna Fabian  Date: 3/3/2025    Referring Physician: No ref. provider found    Chief Complaint   Patient presents with    Hypothyroidism       HISTORY OF PRESENT ILLNESS   Alayna Fabian is a 59 year old female who presents for   Chief Complaint   Patient presents with    Hypothyroidism     60 y/o F presents for follow up evaluation of Addisons Disease diagnosed approximately 6 years ago.  She initially presented in ER at [facility] with adrenal crisis, severe nausea and vomiting.  +weight loss, +lightheadedness, +skin discoloration.    She is now maintained on Hydrocortisone 10mg PO QAM; 5mg PO noon; 5mg at 3pm.  She is also taking florinef 1/2 tablet in AM     She was diagnosed with hypothyroidism approximately 20 years ago.  Her TFTs were borderline for a few years and then started to develop significant symptoms.  She was having mental fogginess, weight gain, fatigue which improved once started on levothyroxine.  She is now maintained on Levothyroxine 100mcg PO daily.  She is taking medication in AM and waiting 30-60 min before eating.     She has been started on Wegovy therapy and she has been able to lose approximately 32lbs.  However she has been off medication for a month due to insurance coverage.     She is a .     Diet: B: veggie smoothie; L: lean meat, vege; D: meat, salad, vege    REVIEW OF SYSTEMS  Eyes: no change in vision  Neurologic: no headache, generalized or focal weakness or numbness.  Head: normal  ENT: normal  Lungs: no shortness of breath, wheezing or STEINER  Cardiovascular:  no chest pain or palpitations  Gastrointestinal:  no abdominal pain, bowel movement problems  Musculoskeletal: no muscle pain or arthralgia  /Gyne: no frequency or discomfort while urinating  Psychiatric:  no acute distress, anxiety  or depression  Skin: normal moisturized skin    Medications:     Current Outpatient Medications:     semaglutide-weight management (WEGOVY) 2.4 MG/0.75ML  Subcutaneous Solution Auto-injector, Inject 0.75 mL (2.4 mg total) into the skin once a week., Disp: 12 each, Rfl: 1    hydrocortisone 20 MG Oral Tab, Take 1 tablet (20 mg total) by mouth daily., Disp: 30 tablet, Rfl: 1    ondansetron (ZOFRAN) 4 mg tablet, Take 1 tablet (4 mg total) by mouth every 8 (eight) hours as needed for Nausea., Disp: 20 tablet, Rfl: 0    hydrocortisone 10 MG Oral Tab, Take 10 mg by mouth every morning, 5 mg by mouth at noon, and 5 mg by mouth at 3pm., Disp: 180 tablet, Rfl: 1    hydrocortisone 5 MG Oral Tab, Take 1 tablet (5 mg) at 12 pm and 1 tablet (5 mg) at 3 pm, Disp: 180 tablet, Rfl: 0    LEVOXYL 100 MCG Oral Tab, Take 1 tablet (100 mcg total) by mouth before breakfast., Disp: 90 tablet, Rfl: 0    Ergocalciferol (VITAMIN D2 OR), Take 3,000 mcg by mouth., Disp: , Rfl:     fludrocortisone 0.1 MG Oral Tab, Take 1/2 tablet per day, Disp: 45 tablet, Rfl: 1    fludrocortisone 0.1 MG Oral Tab, Take 1/2 tablet per day, Disp: 45 tablet, Rfl: 1    hydrocortisone 5 MG Oral Tab, Take 4 tablets per day, Disp: 360 tablet, Rfl: 2     Allergies:   Allergies   Allergen Reactions    Clindamycin HIVES and UNKNOWN     Cerner Allergy Text Annotation: clindamycin      Nitrofurantoin OTHER (SEE COMMENTS), NAUSEA AND VOMITING and UNKNOWN     Headaches  Cerner Allergy Text Annotation: Macrobid      Dilaudid [Hydromorphone] UNKNOWN    Erythromycin UNKNOWN    Penicillins RASH    Sulfa Antibiotics RASH       Social History:   Social History     Socioeconomic History    Marital status: Single   Tobacco Use    Smoking status: Never    Smokeless tobacco: Never   Vaping Use    Vaping status: Never Used   Substance and Sexual Activity    Alcohol use: Yes    Drug use: Never       Medical History:   Past Medical History:    Laredo's disease (HCC)    Asthma (HCC)    Hypothyroidism       Surgical history:   Past Surgical History:   Procedure Laterality Date          Hernia surgery         PHYSICAL  EXAMINATION:  /89   Pulse 77   Wt 181 lb (82.1 kg)   BMI 31.07 kg/m²     General Appearance:  Alert, in no acute distress, well developed  Eyes: normal conjunctivae, sclera.  Ears/Nose/Mouth/Throat/Neck:  normal hearing, normal speech  Musculoskeletal:  normal muscle strength and tone  PV: normal pulses of carotids, pedals  Skin:  normal moisture and skin texture  Hair & Nails:  normal scalp hair     Neuro:  sensory grossly intact and motor grossly intact  Psychiatric:  oriented to time, self, and place  Nutritional:  no abnormal weight gain or loss    ASSESSMENT/PLAN:    1. Addisons Disease  - Discussed diagnosis with patient  - She is aware of sick day protocol  - Continue current dose of hydrocortisone  - Continue fludrocortisone  - Normal electrolytes   - Zofran PRN for acute illness  - Normal electrolytes     2. Hypothyroidism  - Continue Levoxyl 100mcg PO daily  - Discussed taking medication in AM and waiting 30-60 min before eating  - Normal TFTs     3. Vitamin D deficiency  -Normal level     4. Obesity  - Discontinue Wegovy   - Start Zepbound 10mg subcutaneous weekly, verbalized understanding of risks and benefits   - Congratulated patient on weight loss   - Will consider change to Zepbound in the future     RTC 6 months       3/3/2025  Jacque Pino MD

## 2025-03-04 ENCOUNTER — TELEPHONE (OUTPATIENT)
Dept: ENDOCRINOLOGY CLINIC | Facility: CLINIC | Age: 60
End: 2025-03-04

## 2025-03-04 NOTE — TELEPHONE ENCOUNTER
Drug not listed: WEGOVY 2.4 mg/0.75ml Inj (4 pens)  Sig: Inject 2.4 mg under the skin one day  Qty 9    Pharmacy message : Plan does not cover this medication.Please call plan at [phone] to initiate PA or call/fax pharmacy to change medication.Patient ID # is [id number].

## 2025-03-05 ENCOUNTER — PATIENT MESSAGE (OUTPATIENT)
Dept: ENDOCRINOLOGY CLINIC | Facility: CLINIC | Age: 60
End: 2025-03-05

## 2025-03-06 ENCOUNTER — TELEPHONE (OUTPATIENT)
Dept: ENDOCRINOLOGY CLINIC | Facility: CLINIC | Age: 60
End: 2025-03-06

## 2025-03-06 DIAGNOSIS — E27.1 ADDISON DISEASE (HCC): Primary | ICD-10-CM

## 2025-03-06 RX ORDER — SEMAGLUTIDE 2.4 MG/.75ML
2.4 INJECTION, SOLUTION SUBCUTANEOUS WEEKLY
Qty: 4 EACH | Refills: 3 | Status: SHIPPED | OUTPATIENT
Start: 2025-03-06 | End: 2025-03-24

## 2025-03-06 NOTE — TELEPHONE ENCOUNTER
She was previously on Wegovy.  Is this a new insurance plan?  Or will Wegovy still be covered or we don't know?

## 2025-03-06 NOTE — TELEPHONE ENCOUNTER
Patient calling states regards approval for Zepbound. Please call, states no mychart messages wants a call. Is out of medication and needs this new script for new medication. (See patient message from 3/6/25)

## 2025-03-06 NOTE — TELEPHONE ENCOUNTER
Ok, noted update below.  Will contact patient and have her start interaction with Erly.  Maybe we can resubmit in 4-6 weeks.

## 2025-03-06 NOTE — TELEPHONE ENCOUNTER
Dr. Pino --    Do you want send in a script for Wegovy? Since it was d/c'd. Per chart review, we weren't able to submit PA for wegovy because at the time, pt didn't have account with [employer] (company that her insurance uses for PA's).     Now, she does have an account so we can try for Wegovy again?

## 2025-03-06 NOTE — TELEPHONE ENCOUNTER
Dr. Pino -- zepbound is not covered by pt's plan, plan exclusion      Office did not receive fax. Called [employer] provider line at [phone]. Spoke to representative, Barbara. Transferred to Catrina. Pt needs to enroll into [facility] first and then our office can submit prior authorization.     Pt was conference into call with pt's permission. Pt states she already enrolled and it's been approved. States it does take 24-72 hours until it shows on the account.     Catrina was able to find [facility] Account. Provided RN with key.     covermymeds key: [id number]    Submitted PA via covermymeds. Zepbound is not covered by pt's insurance plan. Pt notified via [facility].       Time spent on call: 25 minutes

## 2025-03-06 NOTE — TELEPHONE ENCOUNTER
Patient called to inform Dr. Pino that she has [facility] for her pharmacy and all the medications would need to be ordered through there. The patient also states that her prescriptions will be covered through [employer], The patient was informed that Dr. Pino will need to approve the PA that was sent. Please call the patient

## 2025-03-06 NOTE — TELEPHONE ENCOUNTER
Dr. Pino -- Wegovy is also not covered. When talking to [employer] representative, pt needs to interact with [employer]. Such as logging in online, answering their questions and they will be sending in a scale. Then after, pt will be notified of potential plan exclusion override to get weight loss medications approved.    Pt's chart does not \"verify RX benefits\" updated so cannot submit PA electronically. Will need to call [facility].     Called [employer] [facility] provider line at [phone]. Spoke to Melissa. Transferred to [facility]. Provided covermymeds key for wegovy 2.4mg PA. Was told that pt will need to interact with [employer] for a plan exclusion to be started    Key:  [id number]

## 2025-03-06 NOTE — TELEPHONE ENCOUNTER
Unable to submit prior authorization via [employer] or through [employer].     Called [phone] [employer]. Connected to [employer]. Spoke to representative Theodora. Transferred to [employer].  Was told pt's insurance is inactive. Pt is conference called in with pt's permission. Representative told patient that her insurance inactive and needs to resolve issue prior to initiating prior authorization. Pt states she will figure out this issue and then contact us when resolved. No further action needed at this time.    Time spent on call: 25 minutes

## 2025-03-07 NOTE — TELEPHONE ENCOUNTER
Called pt and notified that she must interact with [facility] [employer] for at least one month by answering questions, weighing in with digital scale and interacting with the program. Advised pt to provide an update in 4 weeks so we can resubmit PA. Patient verbalized understanding.

## 2025-03-24 RX ORDER — TIRZEPATIDE 5 MG/.5ML
5 INJECTION, SOLUTION SUBCUTANEOUS WEEKLY
Qty: 2 ML | Refills: 2 | Status: SHIPPED | OUTPATIENT
Start: 2025-03-24

## 2025-03-24 NOTE — TELEPHONE ENCOUNTER
Called the patient. Verified name and date of birth.     Alayna states that she has now been working with her insurance [facility] health program for 3 weeks. She has a health , she is using a smart scale that tracks her weight and sends to the program, and also tracks food, water intake, and steps.     Despite this, she is gaining weight and not losing. She was previously on Wegovy 2.4 mg weekly and was trying to switch to zepbound. Has now been off of the medication for 1 month.     Dr. Pino, patient would like to try and resubmit insurance authorization now that she is active in the [facility] program. However, asking if her zepbound dose will change now that she has been off for 1 month.        Parent(s)

## 2025-03-24 NOTE — TELEPHONE ENCOUNTER
The patient is calling back to speak with a nurse in regards to providing an update form interacting with [facility]. Please call.

## 2025-03-24 NOTE — TELEPHONE ENCOUNTER
No prescription/pharmacy Benefits on File, surescripts, or CoverMyMeds.    Pharmacy called to gather this information:    Plan name:  BINN:  CARMELN:  Group:  ID:  Plan Phone Number:    On hold for more than 25 min and no answer.  Patient asked to acquire these information to give to  - [employer].

## 2025-03-26 NOTE — TELEPHONE ENCOUNTER
PA Zepbound 5 mg submitted via [facility]  Key: [id number]    Patient notified.

## 2025-03-27 NOTE — TELEPHONE ENCOUNTER
EDUAR Márquez approved:   CaseId:[id number]  Status:Approved  Coverage Start Date:02/24/2025  Coverage End Date:11/21/2025  Authorization Expiration Date: November 21, 2025.    Dr. Pino,     Please see pt message. Thanks!

## 2025-03-28 ENCOUNTER — LAB ENCOUNTER (OUTPATIENT)
Dept: LAB | Age: 60
End: 2025-03-28
Attending: INTERNAL MEDICINE
Payer: COMMERCIAL

## 2025-03-28 ENCOUNTER — HOSPITAL ENCOUNTER (OUTPATIENT)
Age: 60
Discharge: HOME | End: 2025-03-28
Payer: COMMERCIAL

## 2025-03-28 VITALS
RESPIRATION RATE: 18 BRPM | DIASTOLIC BLOOD PRESSURE: 80 MMHG | TEMPERATURE: 98 F | OXYGEN SATURATION: 98 % | SYSTOLIC BLOOD PRESSURE: 112 MMHG | HEART RATE: 64 BPM

## 2025-03-28 DIAGNOSIS — E06.3 HYPOTHYROIDISM DUE TO HASHIMOTO'S THYROIDITIS: ICD-10-CM

## 2025-03-28 DIAGNOSIS — E55.9 VITAMIN D DEFICIENCY: ICD-10-CM

## 2025-03-28 DIAGNOSIS — E27.1 ADRENAL INSUFFICIENCY (ADDISON'S DISEASE) (HCC): ICD-10-CM

## 2025-03-28 DIAGNOSIS — J01.90 ACUTE SINUSITIS, RECURRENCE NOT SPECIFIED, UNSPECIFIED LOCATION: Primary | ICD-10-CM

## 2025-03-28 DIAGNOSIS — E27.1 ADDISON DISEASE (HCC): ICD-10-CM

## 2025-03-28 LAB
ANION GAP SERPL CALC-SCNC: 8 MMOL/L (ref 0–18)
BUN BLD-MCNC: 16 MG/DL (ref 9–23)
BUN/CREAT SERPL: 17.2 (ref 10–20)
CALCIUM BLD-MCNC: 9.2 MG/DL (ref 8.7–10.4)
CHLORIDE SERPL-SCNC: 106 MMOL/L (ref 98–112)
CO2 SERPL-SCNC: 27 MMOL/L (ref 21–32)
CREAT BLD-MCNC: 0.93 MG/DL
EGFRCR SERPLBLD CKD-EPI 2021: 70 ML/MIN/1.73M2 (ref 60–?)
FASTING STATUS PATIENT QL REPORTED: YES
GLUCOSE BLD-MCNC: 78 MG/DL (ref 70–99)
OSMOLALITY SERPL CALC.SUM OF ELEC: 292 MOSM/KG (ref 275–295)
POTASSIUM SERPL-SCNC: 4.6 MMOL/L (ref 3.5–5.1)
SODIUM SERPL-SCNC: 141 MMOL/L (ref 136–145)
T4 FREE SERPL-MCNC: 1.3 NG/DL (ref 0.8–1.7)
TSI SER-ACNC: 1.66 UIU/ML (ref 0.55–4.78)
VIT D+METAB SERPL-MCNC: 32 NG/ML (ref 30–100)

## 2025-03-28 PROCEDURE — 84443 ASSAY THYROID STIM HORMONE: CPT

## 2025-03-28 PROCEDURE — 82306 VITAMIN D 25 HYDROXY: CPT

## 2025-03-28 PROCEDURE — 80048 BASIC METABOLIC PNL TOTAL CA: CPT

## 2025-03-28 PROCEDURE — 86765 RUBEOLA ANTIBODY: CPT

## 2025-03-28 PROCEDURE — 84439 ASSAY OF FREE THYROXINE: CPT

## 2025-03-28 PROCEDURE — 36415 COLL VENOUS BLD VENIPUNCTURE: CPT

## 2025-03-28 PROCEDURE — 99213 OFFICE O/P EST LOW 20 MIN: CPT | Performed by: NURSE PRACTITIONER

## 2025-03-28 RX ORDER — FLUTICASONE PROPIONATE 50 MCG
2 SPRAY, SUSPENSION (ML) NASAL DAILY
Qty: 16 G | Refills: 0 | Status: SHIPPED | OUTPATIENT
Start: 2025-03-28

## 2025-03-28 RX ORDER — DOXYCYCLINE 100 MG/1
100 CAPSULE ORAL 2 TIMES DAILY
Qty: 14 CAPSULE | Refills: 0 | Status: SHIPPED | OUTPATIENT
Start: 2025-03-28 | End: 2025-04-04

## 2025-03-28 NOTE — ED PROVIDER NOTES
Patient Seen in: [facility]      History     Chief Complaint   Patient presents with    Sinus Problem     Stated Complaint: Sinus Pressure    Subjective:   HPI      This is a 59-year-old female with a history of Ernesto's disease asthma and hypothyroidism presenting with sinus pressure congestion green nasal discharge.  Patient states for about 3 weeks she has been dealing with nasal congestion facial pressure right ear pain discolored nasal discharge.  Patient states she does have a history of getting sinus infections she thought maybe it was just a cold and it would go away has been doing over-the-counter Advil cold and sinus and then switched to Advil allergy and sinus with no relief in symptoms.  No fever chest pain shortness of breath vomiting or diarrhea.    Objective:     Past Medical History:    Ernesto's disease (HCC)    Asthma (HCC)    Hypothyroidism              Past Surgical History:   Procedure Laterality Date          Hernia surgery                  Social History     Socioeconomic History    Marital status: Single   Tobacco Use    Smoking status: Never    Smokeless tobacco: Never   Vaping Use    Vaping status: Never Used   Substance and Sexual Activity    Alcohol use: Yes    Drug use: Never     Social Drivers of Health      [facility]    Housing Stability              Review of Systems    Positive for stated complaint: Sinus Pressure  Other systems are as noted in HPI.  Constitutional and vital signs reviewed.      All other systems reviewed and negative except as noted above.    Physical Exam     ED Triage Vitals [25 0823]   /80   Pulse 64   Resp 18   Temp 97.9 °F (36.6 °C)   Temp src Oral   SpO2 98 %   O2 Device None (Room air)       Current Vitals:   Vital Signs  BP: 112/80  Pulse: 64  Resp: 18  Temp: 97.9 °F (36.6 °C)  Temp src: Oral    Oxygen Therapy  SpO2: 98 %  O2 Device: None (Room  air)        Physical Exam  Vitals and nursing note reviewed.   Constitutional:       Appearance: Normal appearance.   HENT:      Right Ear: Tympanic membrane normal.      Left Ear: Tympanic membrane normal.      Nose: Congestion present. No rhinorrhea.      Comments: + enlarged turbinates     Mouth/Throat:      Mouth: Mucous membranes are moist.      Pharynx: Oropharynx is clear. No posterior oropharyngeal erythema.   Eyes:      Conjunctiva/sclera: Conjunctivae normal.   Cardiovascular:      Rate and Rhythm: Normal rate.   Pulmonary:      Effort: Pulmonary effort is normal. No respiratory distress.      Breath sounds: Normal breath sounds. No wheezing.   Musculoskeletal:         General: Normal range of motion.      Cervical back: Normal range of motion. No rigidity or tenderness.   Lymphadenopathy:      Cervical: No cervical adenopathy.   Skin:     General: Skin is warm.      Capillary Refill: Capillary refill takes less than 2 seconds.   Neurological:      General: No focal deficit present.      Mental Status: She is alert and oriented to person, place, and time.           ED Course   Labs Reviewed - No data to display       MDM           Medical Decision Making  59-year-old female nontoxic-appearing presenting with sinus pressure congestion discolored nasal discharge for 3 weeks.  DDx viral versus bacterial sinusitis versus a head cold versus a viral illness no clinical indication for swabs labs or imaging discussed treatment with doxycycline for possible bacterial sinusitis and Flonase nasal spray will be prescribed discussed continuing over-the-counter Tylenol or ibuprofen along with an allergy pill such as Zyrtec or Claritin discussed if symptoms do not resolve after treatment and it is viral and will need to run its course.  All education instructions including outpatient follow-up ER precautions placed in discharge paperwork.  Patient acknowledges understanding discharge instructions.    Problems  Addressed:  Acute sinusitis, recurrence not specified, unspecified location: acute illness or injury    Risk  OTC drugs.  Prescription drug management.        Disposition and Plan     Clinical Impression:  1. Acute sinusitis, recurrence not specified, unspecified location         Disposition:  Discharge  3/28/2025  8:33 am    Follow-up:    Follow-up with your primary care provider within a week              Medications Prescribed:  Discharge Medication List as of 3/28/2025  8:34 AM        START taking these medications    Details   doxycycline 100 MG Oral Cap Take 1 capsule (100 mg total) by mouth 2 (two) times daily for 7 days., Normal, Disp-14 capsule, R-0      fluticasone propionate 50 MCG/ACT Nasal Suspension 2 sprays by Nasal route daily., Normal, Disp-16 g, R-0                 Supplementary Documentation:

## 2025-03-28 NOTE — ED INITIAL ASSESSMENT (HPI)
Patient arrived ambulatory to room c/o symptoms that started 3 weeks ago. +nasal congestion +facial pressure. Unknown fevers. +right ear pain. No cough.

## 2025-03-28 NOTE — DISCHARGE INSTRUCTIONS
Take a probiotic while taking antibiotic recommend starting the Flonase as well you may take Tylenol for pain or discomfort you may continue Zyrtec or Claritin allergy pill.  Drink plenty of fluids warm tea with honey if symptoms do not resolve after treatment with antibiotic and it is viral and it needs to run its course recommend you follow-up with your primary care provider within a week.    If you develop high fever chest pain shortness of breath vomiting diarrhea the worst headache of your life or any new or worsening symptoms go to the nearest emergency department.

## 2025-03-31 LAB
MEASLES (RUBEOLA) AB, IGM, S: NEGATIVE
MEV IGG SER-ACNC: <5 AU/ML (ref 16.5–?)

## 2025-04-11 ENCOUNTER — PATIENT MESSAGE (OUTPATIENT)
Dept: ENDOCRINOLOGY CLINIC | Facility: CLINIC | Age: 60
End: 2025-04-11

## 2025-04-11 RX ORDER — TIRZEPATIDE 7.5 MG/.5ML
7.5 INJECTION, SOLUTION SUBCUTANEOUS WEEKLY
Qty: 2 ML | Refills: 3 | Status: SHIPPED | OUTPATIENT
Start: 2025-04-11

## 2025-04-22 NOTE — TELEPHONE ENCOUNTER
Endocrine Refill protocol for oral medications    Protocol Criteria:  PASSED  Reason: N/A    If below requirement is met, send a 90-day supply with 1 refill per provider protocol.    Verify appointment with Endocrinology completed in the last 6 months or scheduled in the next 3 months.    Last completed office visit: 3/3/2025 Jacque Pino MD   Next scheduled Follow up:   Future Appointments   Date Time Provider Department Center   9/5/2025 11:15 AM [facility]

## 2025-04-22 NOTE — TELEPHONE ENCOUNTER
Endocrine Refill protocol for oral medications    Protocol Criteria:  PASSED  Reason: N/A    If below requirement is met, send a 90-day supply with 1 refill per provider protocol.    Verify appointment with Endocrinology completed in the last 6 months or scheduled in the next 3 months.    Last completed office visit: 3/3/2025 Jacque Pino MD   Next scheduled Follow up:   Future Appointments   Date Time Provider Department Center   9/5/2025 11:15 AM [facility]

## 2025-04-23 RX ORDER — HYDROCORTISONE 20 MG/1
20 TABLET ORAL DAILY
Qty: 30 TABLET | Refills: 1 | Status: SHIPPED | OUTPATIENT
Start: 2025-04-23

## 2025-04-23 RX ORDER — HYDROCORTISONE 5 MG/1
TABLET ORAL
Qty: 180 TABLET | Refills: 0 | Status: SHIPPED | OUTPATIENT
Start: 2025-04-23

## 2025-05-02 RX ORDER — LEVOTHYROXINE SODIUM 100 UG/1
100 TABLET ORAL
Qty: 90 TABLET | Refills: 0 | Status: SHIPPED | OUTPATIENT
Start: 2025-05-02

## 2025-05-02 NOTE — TELEPHONE ENCOUNTER
Endocrine refill protocol for medications for hypothyroidism and hyperthyroidism    Protocol Criteria:  PASSED Reason: N/A    If all below requirements are met, send a 90-day supply with 1 refill per provider protocol.    Verify appointment with Endocrinology completed in the last 12 months or scheduled in the next 6 months.    Normal TSH result in the past 12 months   Review recent telephone encounters and mychart communications with patient to ensure a dose change has not occurred since last office visit that was not updated in the medication history list     Last completed office visit:3/3/2025 Jacque Pino MD   Next scheduled Follow up:   Future Appointments   Date Time Provider Department Center   9/5/2025 11:15 AM Jacque Pino MD [facility]      Last TSH result:   TSH   Date Value Ref Range Status   03/28/2025 1.661 0.550 - 4.780 uIU/mL Final

## 2025-05-05 ENCOUNTER — PATIENT MESSAGE (OUTPATIENT)
Dept: ENDOCRINOLOGY CLINIC | Facility: CLINIC | Age: 60
End: 2025-05-05

## 2025-05-05 RX ORDER — TIRZEPATIDE 10 MG/.5ML
10 INJECTION, SOLUTION SUBCUTANEOUS WEEKLY
Qty: 6 ML | Refills: 1 | Status: SHIPPED | OUTPATIENT
Start: 2025-05-05

## 2025-05-08 ENCOUNTER — TELEPHONE (OUTPATIENT)
Dept: ENDOCRINOLOGY CLINIC | Facility: CLINIC | Age: 60
End: 2025-05-08

## 2025-05-08 NOTE — TELEPHONE ENCOUNTER
Medication Quantity Refills Start End   Tirzepatide-Weight Management (ZEPBOUND) 10 MG/0.5ML Subcutaneous Solution Auto-injector 6 mL 1 5/5/2025 --   Sig:   Inject 10 mg into the skin once a week.     Route:   Subcutaneous     PA need or alternative

## 2025-07-07 RX ORDER — FLUDROCORTISONE ACETATE 0.1 MG/1
0.05 TABLET ORAL DAILY
Qty: 45 TABLET | Refills: 1 | Status: SHIPPED | OUTPATIENT
Start: 2025-07-07

## 2025-07-30 RX ORDER — LEVOTHYROXINE SODIUM 100 UG/1
100 TABLET ORAL
Qty: 90 TABLET | Refills: 1 | Status: SHIPPED | OUTPATIENT
Start: 2025-07-30

## (undated) NOTE — LETTER
Date & Time: 12/2/2022, 9:25 AM  Patient: Mohamud Yee  Encounter Provider(s):    ISABELLA Vicente       To Whom It May Concern: Barbara Ferguson was seen and treated in our department on 12/2/2022.  She can return to work 12/05/2022    ISABELLA Blas, 12/02/22, 9:25 AM